# Patient Record
Sex: FEMALE | Race: ASIAN | Employment: UNEMPLOYED | URBAN - METROPOLITAN AREA
[De-identification: names, ages, dates, MRNs, and addresses within clinical notes are randomized per-mention and may not be internally consistent; named-entity substitution may affect disease eponyms.]

---

## 2018-12-28 ENCOUNTER — HOSPITAL ENCOUNTER (OUTPATIENT)
Dept: MAMMOGRAPHY | Age: 43
Discharge: HOME | End: 2018-12-28
Attending: FAMILY MEDICINE
Payer: COMMERCIAL

## 2018-12-28 DIAGNOSIS — Z12.39 BREAST CANCER SCREENING: ICD-10-CM

## 2018-12-28 PROCEDURE — 77067 SCR MAMMO BI INCL CAD: CPT

## 2019-12-23 ENCOUNTER — HOSPITAL ENCOUNTER (OUTPATIENT)
Dept: CT IMAGING | Age: 44
Discharge: HOME | End: 2019-12-23
Attending: EMERGENCY MEDICINE
Payer: COMMERCIAL

## 2019-12-23 DIAGNOSIS — R51.9 HEADACHE: ICD-10-CM

## 2019-12-23 PROCEDURE — 70450 CT HEAD/BRAIN W/O DYE: CPT

## 2020-01-17 ENCOUNTER — HOSPITAL ENCOUNTER (OUTPATIENT)
Dept: MAMMOGRAPHY | Age: 45
Discharge: HOME | End: 2020-01-17
Attending: FAMILY MEDICINE
Payer: COMMERCIAL

## 2020-01-17 DIAGNOSIS — Z12.31 VISIT FOR SCREENING MAMMOGRAM: ICD-10-CM

## 2020-01-17 PROCEDURE — 77067 SCR MAMMO BI INCL CAD: CPT

## 2020-06-22 ENCOUNTER — PATIENT OUTREACH (OUTPATIENT)
Dept: CASE MANAGEMENT | Age: 45
End: 2020-06-22

## 2020-06-22 NOTE — PROGRESS NOTES
Yellow alert noted in remote symptom monitoring program. Messaged patient to notify Ricardo Quintero if symptoms have worsened since yesterday or if they would like to have a nurse reach out.

## 2020-10-30 PROBLEM — Z01.419 WELL WOMAN EXAM: Status: ACTIVE | Noted: 2020-10-30

## 2022-03-01 ENCOUNTER — HOSPITAL ENCOUNTER (OUTPATIENT)
Dept: MAMMOGRAPHY | Age: 47
Discharge: HOME | End: 2022-03-01
Attending: FAMILY MEDICINE
Payer: COMMERCIAL

## 2022-03-01 DIAGNOSIS — Z12.31 ENCOUNTER FOR SCREENING MAMMOGRAM FOR HIGH-RISK PATIENT: ICD-10-CM

## 2022-03-01 DIAGNOSIS — Z12.31 SCREENING MAMMOGRAM FOR HIGH-RISK PATIENT: ICD-10-CM

## 2022-03-01 PROCEDURE — 77066 DX MAMMO INCL CAD BI: CPT

## 2022-03-10 PROBLEM — M77.11 RIGHT TENNIS ELBOW: Status: ACTIVE | Noted: 2022-03-10

## 2022-03-18 PROBLEM — M77.11 RIGHT TENNIS ELBOW: Status: ACTIVE | Noted: 2022-03-10

## 2022-03-20 PROBLEM — Z01.419 WELL WOMAN EXAM: Status: ACTIVE | Noted: 2020-10-30

## 2022-11-08 ENCOUNTER — OFFICE VISIT (OUTPATIENT)
Dept: INTERNAL MEDICINE CLINIC | Facility: CLINIC | Age: 47
End: 2022-11-08
Payer: MEDICAID

## 2022-11-08 ENCOUNTER — HOSPITAL ENCOUNTER (OUTPATIENT)
Dept: GENERAL RADIOLOGY | Age: 47
Discharge: HOME | End: 2022-11-11
Payer: MEDICAID

## 2022-11-08 VITALS
HEART RATE: 93 BPM | WEIGHT: 132 LBS | HEIGHT: 59 IN | BODY MASS INDEX: 26.61 KG/M2 | TEMPERATURE: 98.1 F | SYSTOLIC BLOOD PRESSURE: 110 MMHG | DIASTOLIC BLOOD PRESSURE: 80 MMHG | OXYGEN SATURATION: 99 %

## 2022-11-08 DIAGNOSIS — Z76.89 ENCOUNTER TO ESTABLISH CARE: ICD-10-CM

## 2022-11-08 DIAGNOSIS — R42 EPISODIC LIGHTHEADEDNESS: ICD-10-CM

## 2022-11-08 DIAGNOSIS — F31.75 BIPOLAR 1 DISORDER, DEPRESSED, PARTIAL REMISSION (HCC): ICD-10-CM

## 2022-11-08 DIAGNOSIS — M54.16 RIGHT LUMBAR RADICULOPATHY: ICD-10-CM

## 2022-11-08 DIAGNOSIS — M79.651 PAIN IN RIGHT THIGH: ICD-10-CM

## 2022-11-08 DIAGNOSIS — G44.52 NEW DAILY PERSISTENT HEADACHE: Primary | ICD-10-CM

## 2022-11-08 PROCEDURE — 72100 X-RAY EXAM L-S SPINE 2/3 VWS: CPT

## 2022-11-08 PROCEDURE — 99204 OFFICE O/P NEW MOD 45 MIN: CPT | Performed by: PHYSICIAN ASSISTANT

## 2022-11-08 PROCEDURE — 73502 X-RAY EXAM HIP UNI 2-3 VIEWS: CPT

## 2022-11-08 ASSESSMENT — PATIENT HEALTH QUESTIONNAIRE - PHQ9
SUM OF ALL RESPONSES TO PHQ9 QUESTIONS 1 & 2: 0
SUM OF ALL RESPONSES TO PHQ QUESTIONS 1-9: 0
2. FEELING DOWN, DEPRESSED OR HOPELESS: 0
SUM OF ALL RESPONSES TO PHQ QUESTIONS 1-9: 0
1. LITTLE INTEREST OR PLEASURE IN DOING THINGS: 0

## 2022-11-08 ASSESSMENT — ENCOUNTER SYMPTOMS
VOMITING: 0
NAUSEA: 0

## 2022-11-08 NOTE — PROGRESS NOTES
Getachew Sigala (:  1975) is a 49 y.o. female,New patient, here for evaluation of the following chief complaint(s):  New Patient (Pt c/o pain on R side of R thigh for the past several months. She states it is a constant pain and there was no trauma to the area. She states that there is itching in the same area as well. She also c/o feeling fatigued since starting a diet with minimal carbs and sugar. )         ASSESSMENT/PLAN:  1. New daily persistent headache  -     CT HEAD WO CONTRAST; Future  2. Right lumbar radiculopathy  3. Episodic lightheadedness  -     CT HEAD WO CONTRAST; Future  4. Pain in right thigh  -     XR LUMBAR SPINE (2-3 VIEWS); Future  -     XR HIP RIGHT (2-3 VIEWS); Future  5. Encounter to establish care  6. Bipolar 1 disorder, depressed, partial remission ([facility] [address]      Patient Instructions   Recommend incorporating an electrolyte drink 1-2 times/day along with the water she is drinking  Continue chronic medications as prescribed  Recommend getting newest COVID vaccine given formulation that should better protect against the most recently circulating Omicron variant  Consider physical therapy if x-rays don't reveal any specific abnormality with spine or hip      Return in about 2 months (around 2023) for routine physical.         Subjective   SUBJECTIVE/OBJECTIVE:  HPI  Patient presents today with daughter and the assistance of an  to establish care with a new PCP. Medical history is positive for bipolar 1 disorder managed with Abilify 10 mg daily by psychiatry - Dr. Bay Enamorado. Patient states she was recently increased from 5 to 10 mg daily but states it wasn't as helpful with symptoms of social issues when a lot of people are around. She complains of daily headaches upon getting up in the AM x several months. Pain is described as squeezing around her head.   Duration is occasionally all day but sometimes is able to distract herself with other activities and/or relieve with a single dose of Advil. She reports drinking about 2 liters of water daily and sleeps ~ 7 hours (10 pm - 5 am). Additional concerns addressed today include a pain in R leg described as \"heavy crushing pain\" along lateral side of the leg from knee up to waist/middle of buttock x 6 months. Triggering positions are leaning on or lying on R side. She has a history of back pain x 3 years that spontaneously resolved and was not specifically diagnosed. She also describes an itching sensation from center of buttock to waist level. Past Medical History:   Diagnosis Date    Allergic rhinitis 11/1/2016    Bipolar 1 disorder ([facility] [address] 11/1/2016    Depression 11/1/2016    Hypercholesterolemia 11/1/2016    Nontoxic uninodular goiter 11/1/2016       No past surgical history on file. Family History   Problem Relation Age of Onset    No Known Problems Mother     Liver Disease Father     Breast Cancer Neg Hx        Social History     Socioeconomic History    Marital status:      Spouse name: None    Number of children: None    Years of education: None    Highest education level: None   Tobacco Use    Smoking status: Never    Smokeless tobacco: Never   Vaping Use    Vaping Use: Never used   Substance and Sexual Activity    Alcohol use: Never    Drug use: Never    Sexual activity: Yes     Partners: Male       Current Outpatient Medications   Medication Sig Dispense Refill    ARIPiprazole (ABILIFY) 10 MG tablet Take 10 mg by mouth daily      ibuprofen (ADVIL;MOTRIN) 600 MG tablet Take 600 mg by mouth 2 times daily       No current facility-administered medications for this visit. Allergies   Allergen Reactions    Penicillins Rash and Other (See Comments)         Review of Systems   Constitutional:  Positive for fatigue (since starting a new diet). Negative for unexpected weight change. Eyes:  Negative for visual disturbance. Gastrointestinal:  Negative for nausea and vomiting.    Musculoskeletal:  Negative for back pain. Positive for R leg pain   Skin:         Positive for itching R buttock/R lower back   Neurological:  Positive for dizziness (lightheaded when tired or headache is present) and headaches. Negative for weakness and numbness. /80 (Site: Left Upper Arm, Position: Sitting, Cuff Size: Large Adult)   Pulse 93   Temp 98.1 °F (36.7 °C) (Temporal)   Ht 4' 11\" (1.499 m)   Wt 132 lb (59.9 kg)   LMP 11/08/2022   SpO2 99%   BMI 26.66 kg/m²       Objective   Physical Exam  Constitutional:       Appearance: Normal appearance. HENT:      Head: Normocephalic and atraumatic. Eyes:      Conjunctiva/sclera: Conjunctivae normal.      Pupils: Pupils are equal, round, and reactive to light. Neck:      Vascular: No carotid bruit. Cardiovascular:      Rate and Rhythm: Normal rate and regular rhythm. Heart sounds: Normal heart sounds. Pulmonary:      Effort: Pulmonary effort is normal.      Breath sounds: Normal breath sounds. Musculoskeletal:         General: Normal range of motion. Cervical back: Normal and normal range of motion. Lumbar back: Normal. Negative right straight leg raise test and negative left straight leg raise test.   Skin:     General: Skin is warm and dry. Findings: No rash. Neurological:      Mental Status: She is alert and oriented to person, place, and time. Deep Tendon Reflexes:      Reflex Scores:       Patellar reflexes are 2+ on the right side and 2+ on the left side. Psychiatric:         Mood and Affect: Mood normal.         Behavior: Behavior normal.         Thought Content: Thought content normal.         Judgment: Judgment normal.          On this date 11/8/2022 I have spent 55 minutes reviewing previous notes, test results and face to face with the patient discussing the diagnosis and importance of compliance with the treatment plan as well as documenting on the day of the visit.       An electronic signature was used to authenticate this note.     --Nathalie Soulier, PA-C

## 2022-11-10 NOTE — RESULT ENCOUNTER NOTE
X-ray shows minimal bone spurs in lumbar spine but alignment is normal & mild arthritis in R hip which doesn't explain the pain in her R thigh. I would like her to do physical therapy to try to alleviate the pain she is having in the leg. If she is agreeable, please find out which part of town she lives in so I can get her set up somewhere convenient to them.

## 2022-11-14 ENCOUNTER — TELEPHONE (OUTPATIENT)
Dept: INTERNAL MEDICINE CLINIC | Facility: CLINIC | Age: 47
End: 2022-11-14

## 2022-11-14 ASSESSMENT — ENCOUNTER SYMPTOMS: BACK PAIN: 0

## 2022-11-14 NOTE — TELEPHONE ENCOUNTER
ZOE on 11/14/22 @ 9:30 AM to inform pt's daughter that they need to contact radiology scheduling for pt to schedule her head CT ordered by Chapincito Mc on 11/8/22. Radiology scheduling's number left on v/m.

## 2022-11-15 NOTE — PATIENT INSTRUCTIONS
Recommend incorporating an electrolyte drink 1-2 times/day along with the water she is drinking  Continue chronic medications as prescribed  Recommend getting newest COVID vaccine given formulation that should better protect against the most recently circulating Omicron variant  Consider physical therapy if x-rays don't reveal any specific abnormality with spine or hip

## 2023-01-05 DIAGNOSIS — E78.00 HYPERCHOLESTEROLEMIA: Primary | ICD-10-CM

## 2023-01-05 DIAGNOSIS — E78.00 HYPERCHOLESTEROLEMIA: ICD-10-CM

## 2023-01-05 LAB
ALBUMIN SERPL-MCNC: 3.6 G/DL (ref 3.5–5)
ALBUMIN/GLOB SERPL: 0.9 (ref 0.4–1.6)
ALP SERPL-CCNC: 76 U/L (ref 50–136)
ALT SERPL-CCNC: 41 U/L (ref 12–65)
ANION GAP SERPL CALC-SCNC: 4 MMOL/L (ref 2–11)
APPEARANCE UR: CLEAR
AST SERPL-CCNC: 31 U/L (ref 15–37)
BACTERIA URNS QL MICRO: NEGATIVE /HPF
BASOPHILS # BLD: 0 K/UL (ref 0–0.2)
BASOPHILS NFR BLD: 0 % (ref 0–2)
BILIRUB SERPL-MCNC: 0.2 MG/DL (ref 0.2–1.1)
BILIRUB UR QL: NEGATIVE
BUN SERPL-MCNC: 8 MG/DL (ref 6–23)
CALCIUM SERPL-MCNC: 8.4 MG/DL (ref 8.3–10.4)
CASTS URNS QL MICRO: ABNORMAL /LPF (ref 0–2)
CHLORIDE SERPL-SCNC: 107 MMOL/L (ref 101–110)
CHOLEST SERPL-MCNC: 227 MG/DL
CO2 SERPL-SCNC: 26 MMOL/L (ref 21–32)
COLOR UR: ABNORMAL
CREAT SERPL-MCNC: 0.6 MG/DL (ref 0.6–1)
DIFFERENTIAL METHOD BLD: NORMAL
EOSINOPHIL # BLD: 0.1 K/UL (ref 0–0.8)
EOSINOPHIL NFR BLD: 2 % (ref 0.5–7.8)
EPI CELLS #/AREA URNS HPF: ABNORMAL /HPF (ref 0–5)
ERYTHROCYTE [DISTWIDTH] IN BLOOD BY AUTOMATED COUNT: 13.5 % (ref 11.9–14.6)
GLOBULIN SER CALC-MCNC: 3.8 G/DL (ref 2.8–4.5)
GLUCOSE SERPL-MCNC: 92 MG/DL (ref 65–100)
GLUCOSE UR STRIP.AUTO-MCNC: NEGATIVE MG/DL
HCT VFR BLD AUTO: 39.8 % (ref 35.8–46.3)
HDLC SERPL-MCNC: 51 MG/DL (ref 40–60)
HDLC SERPL: 4.5
HGB BLD-MCNC: 12.9 G/DL (ref 11.7–15.4)
HGB UR QL STRIP: ABNORMAL
IMM GRANULOCYTES # BLD AUTO: 0 K/UL (ref 0–0.5)
IMM GRANULOCYTES NFR BLD AUTO: 0 % (ref 0–5)
KETONES UR QL STRIP.AUTO: NEGATIVE MG/DL
LDLC SERPL CALC-MCNC: 140.8 MG/DL
LEUKOCYTE ESTERASE UR QL STRIP.AUTO: NEGATIVE
LYMPHOCYTES # BLD: 2.2 K/UL (ref 0.5–4.6)
LYMPHOCYTES NFR BLD: 43 % (ref 13–44)
MCH RBC QN AUTO: 28.5 PG (ref 26.1–32.9)
MCHC RBC AUTO-ENTMCNC: 32.4 G/DL (ref 31.4–35)
MCV RBC AUTO: 88.1 FL (ref 82–102)
MONOCYTES # BLD: 0.4 K/UL (ref 0.1–1.3)
MONOCYTES NFR BLD: 8 % (ref 4–12)
MUCOUS THREADS URNS QL MICRO: 0 /LPF
NEUTS SEG # BLD: 2.3 K/UL (ref 1.7–8.2)
NEUTS SEG NFR BLD: 47 % (ref 43–78)
NITRITE UR QL STRIP.AUTO: NEGATIVE
NRBC # BLD: 0 K/UL (ref 0–0.2)
PH UR STRIP: 6.5 (ref 5–9)
PLATELET # BLD AUTO: 213 K/UL (ref 150–450)
PMV BLD AUTO: 10.1 FL (ref 9.4–12.3)
POTASSIUM SERPL-SCNC: 4 MMOL/L (ref 3.5–5.1)
PROT SERPL-MCNC: 7.4 G/DL (ref 6.3–8.2)
PROT UR STRIP-MCNC: NEGATIVE MG/DL
RBC # BLD AUTO: 4.52 M/UL (ref 4.05–5.2)
RBC #/AREA URNS HPF: ABNORMAL /HPF (ref 0–5)
SODIUM SERPL-SCNC: 137 MMOL/L (ref 133–143)
SP GR UR REFRACTOMETRY: 1.01 (ref 1–1.02)
TRIGL SERPL-MCNC: 176 MG/DL (ref 35–150)
TSH, 3RD GENERATION: 2.5 UIU/ML (ref 0.36–3.74)
URINE CULTURE IF INDICATED: ABNORMAL
UROBILINOGEN UR QL STRIP.AUTO: 0.2 EU/DL (ref 0.2–1)
VLDLC SERPL CALC-MCNC: 35.2 MG/DL (ref 6–23)
WBC # BLD AUTO: 5 K/UL (ref 4.3–11.1)
WBC URNS QL MICRO: ABNORMAL /HPF (ref 0–4)

## 2023-01-09 ENCOUNTER — TELEPHONE (OUTPATIENT)
Dept: INTERNAL MEDICINE CLINIC | Facility: CLINIC | Age: 48
End: 2023-01-09

## 2023-01-09 ENCOUNTER — TELEMEDICINE (OUTPATIENT)
Dept: INTERNAL MEDICINE CLINIC | Facility: CLINIC | Age: 48
End: 2023-01-09
Payer: MEDICAID

## 2023-01-09 DIAGNOSIS — E78.00 HYPERCHOLESTEROLEMIA: Primary | ICD-10-CM

## 2023-01-09 DIAGNOSIS — F31.9 BIPOLAR 1 DISORDER (HCC): ICD-10-CM

## 2023-01-09 DIAGNOSIS — G44.52 NEW DAILY PERSISTENT HEADACHE: ICD-10-CM

## 2023-01-09 DIAGNOSIS — Z12.31 SCREENING MAMMOGRAM FOR BREAST CANCER: ICD-10-CM

## 2023-01-09 PROCEDURE — 99214 OFFICE O/P EST MOD 30 MIN: CPT | Performed by: NURSE PRACTITIONER

## 2023-01-09 ASSESSMENT — ENCOUNTER SYMPTOMS
ABDOMINAL PAIN: 0
SORE THROAT: 0
DIARRHEA: 0
VOMITING: 0
SHORTNESS OF BREATH: 0
COUGH: 0
CONSTIPATION: 0
NAUSEA: 0
EYE PAIN: 0
RHINORRHEA: 0

## 2023-01-09 NOTE — PROGRESS NOTES
Lila North (:  1975) is a Established patient, here for evaluation of the following:    Assessment & Plan   Below is the assessment and plan developed based on review of pertinent history, physical exam, labs, studies, and medications. 1. Hypercholesterolemia  The patient is asked to make an attempt to improve diet and exercise patterns to aid in medical management of this problem. Exercise recommendations of at least 150 minutes weekly discussed. 2. Bipolar 1 disorder ([address]  Continue follow-up and management per psychiatry. 3. New daily persistent headache  Improved with increased water intake and incorporation of electrolyte drink daily. Encouraged healthy (whole foods) diet, regular exercise, sleep hygiene and stress reduction. 4. Screening mammogram for breast cancer  -     [facility] DIGITAL SCREEN W OR WO CAD BILATERAL; Future      Return in about 6 months (around 2023) for ROUTINE FOLLOW-UP, LAB REVIEW & MEDICATION REFILL. Subjective   HPI  Patient seen virtually today for follow-up on chronic conditions and results review. The patient is a 49 y.o. female who is seen for evaluation of  hyperlipidemia. She was tested because history of elevated LDL. Her last labs showed Total cholesterol of 227, HDL 51, ,  Triglycerides 176. Cardiac symptoms: none. She is followed quarterly by Dr. Arnol Fiore (psychiatry) for management of her bipolar disorder. She is happy with the Abilify that she is currently on and denies any unfavorable side effects. Headaches still present intermittently but are improved since patient has increased fluid intake as recommended at last visit. Head CT recommended, but coverage was declined by insurance per patient. Up-to-date on cervical cancer screening. Will be due to repeat mammogram later this year. Declines colon cancer screening at this time. Labs reviewed and discussed.  Medications refilled as needed during office visit or adjusted based on lab results and/or our discussion as appropriate. See discussion. Review of Systems   Constitutional:  Negative for chills and fever. HENT:  Negative for congestion, ear pain, postnasal drip, rhinorrhea and sore throat. Eyes:  Negative for pain and visual disturbance. Respiratory:  Negative for cough and shortness of breath. Cardiovascular:  Negative for chest pain, palpitations and leg swelling. Gastrointestinal:  Negative for abdominal pain, constipation, diarrhea, nausea and vomiting. Genitourinary:  Negative for dysuria and hematuria. Musculoskeletal:  Negative for neck pain. Neurological:  Positive for headaches. Negative for dizziness, syncope and light-headedness. Objective      Physical Exam  Constitutional:       General: She is not in acute distress. Appearance: Normal appearance. HENT:      Head: Normocephalic and atraumatic. Pulmonary:      Effort: No respiratory distress. Neurological:      Mental Status: She is alert and oriented to person, place, and time.    Psychiatric:         Mood and Affect: Mood normal.         Lab Results   Component Value Date/Time    WBC 5.0 01/05/2023 10:27 AM    HGB 12.9 01/05/2023 10:27 AM    HCT 39.8 01/05/2023 10:27 AM    MCV 88.1 01/05/2023 10:27 AM    RDW 13.5 01/05/2023 10:27 AM     01/05/2023 10:27 AM    NEUTOPHILPCT 51 12/28/2021 01:43 PM    LYMPHOPCT 43 01/05/2023 10:27 AM    LYMPHOPCT 36 12/28/2021 01:43 PM    MONOPCT 8 01/05/2023 10:27 AM    MONOPCT 7 12/28/2021 01:43 PM    EOSRELPCT 2 01/05/2023 10:27 AM    BASOPCT 0 01/05/2023 10:27 AM    BASOPCT 0 12/28/2021 01:43 PM    LYMPHSABS 2.2 01/05/2023 10:27 AM    LYMPHSABS 2.3 12/28/2021 01:43 PM    MONOSABS 0.4 01/05/2023 10:27 AM    MONOSABS 0.5 12/28/2021 01:43 PM    EOSABS 0.1 01/05/2023 10:27 AM    EOSABS 0.4 12/28/2021 01:43 PM    BASOSABS 0.0 01/05/2023 10:27 AM    IMMGRAN 0 01/05/2023 10:27 AM    GRANULOCYTEABSOLUTECOUNT 0.0 12/28/2021 01:43 PM       Lab Results   Component Value Date/Time     01/05/2023 10:27 AM    K 4.0 01/05/2023 10:27 AM     01/05/2023 10:27 AM    CO2 26 01/05/2023 10:27 AM    ANIONGAP 4 01/05/2023 10:27 AM    GLUCOSE 92 01/05/2023 10:27 AM    BUN 8 01/05/2023 10:27 AM    CREATININE 0.60 01/05/2023 10:27 AM    GFRAA 125 12/28/2021 01:43 PM    LABGLOM >60 01/05/2023 10:27 AM    CALCIUM 8.4 01/05/2023 10:27 AM    BILITOT 0.2 01/05/2023 10:27 AM    ALT 41 01/05/2023 10:27 AM    AST 31 01/05/2023 10:27 AM    ALKPHOS 76 01/05/2023 10:27 AM    ALKPHOS 55 12/28/2021 01:43 PM    PROT 7.4 01/05/2023 10:27 AM    LABALBU 3.6 01/05/2023 10:27 AM    GLOB 3.8 01/05/2023 10:27 AM    ALBUMIN 0.9 01/05/2023 10:27 AM       Lab Results   Component Value Date/Time    CHOL 227 01/05/2023 10:27 AM    HDL 51 01/05/2023 10:27 AM    TRIG 176 01/05/2023 10:27 AM    LDLCALC 140.8 01/05/2023 10:27 AM    VLDL 18 12/28/2021 01:43 PM       No results found for: LABA1C    Lab Results   Component Value Date/Time    TSH 1.590 12/28/2021 01:43 PM    TSH 2.220 11/12/2020 01:45 PM       No results found for: PSA    Lab Results   Component Value Date/Time    SPECGRAV 1.006 01/05/2023 10:27 AM    COLORU YELLOW/STRAW 01/05/2023 10:27 AM    LEUKOCYTESUR Negative 01/05/2023 10:27 AM    PROTEINU Negative 01/05/2023 10:27 AM    GLUCOSEU Negative 01/05/2023 10:27 AM    KETUA Negative 01/05/2023 10:27 AM    BLOODU TRACE 01/05/2023 10:27 AM    BILIRUBINUR Negative 01/05/2023 10:27 AM    UROBILINOGEN 0.2 01/05/2023 10:27 AM    NITRU Negative 01/05/2023 10:27 AM            On this date 1/9/2023 I have spent 30 minutes reviewing previous notes, test results and face to face (virtual) with the patient discussing the diagnosis and importance of compliance with the treatment plan as well as documenting on the day of the visit. Nicolas Singh, was evaluated through a synchronous (real-time) audio-video encounter.  The patient (or guardian if applicable) is aware that this is a billable service, which includes applicable co-pays. This Virtual Visit was conducted with patient's (and/or legal guardian's) consent. The visit was conducted pursuant to the emergency declaration under the [facility], [address] [place] [address] authority and the [place] and [employer] Act. Patient identification was verified, and a caregiver was present when appropriate. The patient was located at Home: [address] [address]. Provider was located at [facility] (Appt Dept): [address] [facility] [address],  [address].         --AUDRA Chance - CNP

## 2023-03-07 ENCOUNTER — HOSPITAL ENCOUNTER (OUTPATIENT)
Dept: MAMMOGRAPHY | Age: 48
Discharge: HOME | End: 2023-03-10
Payer: COMMERCIAL

## 2023-03-07 DIAGNOSIS — Z12.31 SCREENING MAMMOGRAM FOR BREAST CANCER: ICD-10-CM

## 2023-03-07 PROCEDURE — 77067 SCR MAMMO BI INCL CAD: CPT

## 2023-07-13 ENCOUNTER — HOSPITAL ENCOUNTER (OUTPATIENT)
Dept: LAB | Age: 48
Discharge: HOME | End: 2023-07-16

## 2023-07-13 DIAGNOSIS — E78.2 MIXED HYPERLIPIDEMIA: ICD-10-CM

## 2023-07-13 LAB
CHOLEST SERPL-MCNC: 249 MG/DL
HDLC SERPL-MCNC: 45 MG/DL (ref 40–60)
HDLC SERPL: 5.5
LDLC SERPL CALC-MCNC: ABNORMAL MG/DL
LDLC SERPL DIRECT ASSAY-MCNC: 137 MG/DL
TRIGL SERPL-MCNC: 412 MG/DL (ref 35–150)
VLDLC SERPL CALC-MCNC: 82.4 MG/DL (ref 6–23)

## 2023-07-17 NOTE — RESULT ENCOUNTER NOTE
Cholesterol levels have increased even further rather than decreased as we were aiming for - LDL is relatively stable but needs to be lower (this is caused from high fat foods like fried foods, red meats, cheese, egg yolks, ice cream, etc) but triglycerides increased the worst by 236 points in the past 6 months (this value is affected by alcohol, sugar & carbohydrates - bread, pasta, potatoes, rice). Please have her come in to discuss these values in person.

## 2023-09-11 ASSESSMENT — ENCOUNTER SYMPTOMS: SHORTNESS OF BREATH: 0

## 2023-09-11 NOTE — PROGRESS NOTES
Mitch Nuñez (:  1975) is a 51 y.o. female,Established patient, here for evaluation of the following chief complaint(s):  Follow-up (Hypertriglyceridemia) and Discuss Labs         ASSESSMENT/PLAN:  1. Secondary hypertriglyceridemia  Comments:  Likely secondary to antipsychotic  Orders:  -     Comprehensive Metabolic Panel; Future  -     Lipid Panel; Future  2. Chronic gastritis without bleeding, unspecified gastritis type  -     famotidine (PEPCID) 20 MG tablet; Take 1 tablet by mouth 2 times daily, Disp-60 tablet, R-3Normal  -     CBC with Auto Differential; Future  3. Bipolar 1 disorder ([facility])  -     Georgette Molina MD, Psychiatry, [facility]      Patient Instructions   Recommend trial switch from Abilify to Arlyce Boards in hopes of alleviating metabolic issues that seem to be arising with increased dose of Abilify  Start Vraylar 1.5 mg daily x 1 week then increase to 3 mg daily for maintenance  Discontinue Abilify  Begin Famotidine 20 mg twice daily for stomach issues/symptoms - plan to continue x 2 months  Maintain a low sugar diet with concentration of fresh whole foods  Encouraged to proceed with sleep study for thorough evaluation of her fatigue & daily headaches      Return in about 6 weeks (around 10/24/2023) for recheck. Subjective   SUBJECTIVE/OBJECTIVE:  HPI  The patient is a 51 y.o. female who is seen for evaluation of hyperlipidemia. She was tested because of mixed hyperlipidemia. The current state of this condition is asymptomatic and poorly controlled - not currently on medications for this problem. She has maintained a reduced sugar & fatty food diet but has unfortunately still gained weight and triglycerides continue to exponentially increase. She admits to not exercising due to it making her feel tired. Medication changes include increasing Abilify to 10 mg daily ~ 6 months ago.       Last Lipid Panel:   Lab Results   Component Value Date    CHOL 249 (H) 2023    CHOL 227

## 2023-09-12 ENCOUNTER — OFFICE VISIT (OUTPATIENT)
Dept: INTERNAL MEDICINE CLINIC | Facility: CLINIC | Age: 48
End: 2023-09-12
Payer: COMMERCIAL

## 2023-09-12 VITALS
HEART RATE: 88 BPM | DIASTOLIC BLOOD PRESSURE: 78 MMHG | OXYGEN SATURATION: 98 % | WEIGHT: 143 LBS | BODY MASS INDEX: 28.83 KG/M2 | SYSTOLIC BLOOD PRESSURE: 108 MMHG | HEIGHT: 59 IN | TEMPERATURE: 97.3 F

## 2023-09-12 DIAGNOSIS — F31.9 BIPOLAR 1 DISORDER (HCC): ICD-10-CM

## 2023-09-12 DIAGNOSIS — K29.50 CHRONIC GASTRITIS WITHOUT BLEEDING, UNSPECIFIED GASTRITIS TYPE: ICD-10-CM

## 2023-09-12 DIAGNOSIS — E78.1 SECONDARY HYPERTRIGLYCERIDEMIA: Primary | ICD-10-CM

## 2023-09-12 PROCEDURE — 99215 OFFICE O/P EST HI 40 MIN: CPT | Performed by: PHYSICIAN ASSISTANT

## 2023-09-12 RX ORDER — FAMOTIDINE 20 MG/1
20 TABLET, FILM COATED ORAL 2 TIMES DAILY
Qty: 60 TABLET | Refills: 3 | Status: SHIPPED | OUTPATIENT
Start: 2023-09-12

## 2023-09-12 ASSESSMENT — ANXIETY QUESTIONNAIRES
GAD7 TOTAL SCORE: 15
5. BEING SO RESTLESS THAT IT IS HARD TO SIT STILL: 1
3. WORRYING TOO MUCH ABOUT DIFFERENT THINGS: 3
7. FEELING AFRAID AS IF SOMETHING AWFUL MIGHT HAPPEN: 2
6. BECOMING EASILY ANNOYED OR IRRITABLE: 2
2. NOT BEING ABLE TO STOP OR CONTROL WORRYING: 3
1. FEELING NERVOUS, ANXIOUS, OR ON EDGE: 3
4. TROUBLE RELAXING: 1
IF YOU CHECKED OFF ANY PROBLEMS ON THIS QUESTIONNAIRE, HOW DIFFICULT HAVE THESE PROBLEMS MADE IT FOR YOU TO DO YOUR WORK, TAKE CARE OF THINGS AT HOME, OR GET ALONG WITH OTHER PEOPLE: VERY DIFFICULT

## 2023-09-12 ASSESSMENT — PATIENT HEALTH QUESTIONNAIRE - PHQ9
10. IF YOU CHECKED OFF ANY PROBLEMS, HOW DIFFICULT HAVE THESE PROBLEMS MADE IT FOR YOU TO DO YOUR WORK, TAKE CARE OF THINGS AT HOME, OR GET ALONG WITH OTHER PEOPLE: 2
SUM OF ALL RESPONSES TO PHQ9 QUESTIONS 1 & 2: 4
5. POOR APPETITE OR OVEREATING: 0
4. FEELING TIRED OR HAVING LITTLE ENERGY: 1
SUM OF ALL RESPONSES TO PHQ QUESTIONS 1-9: 13
6. FEELING BAD ABOUT YOURSELF - OR THAT YOU ARE A FAILURE OR HAVE LET YOURSELF OR YOUR FAMILY DOWN: 3
SUM OF ALL RESPONSES TO PHQ QUESTIONS 1-9: 12
7. TROUBLE CONCENTRATING ON THINGS, SUCH AS READING THE NEWSPAPER OR WATCHING TELEVISION: 2
2. FEELING DOWN, DEPRESSED OR HOPELESS: 2
SUM OF ALL RESPONSES TO PHQ QUESTIONS 1-9: 13
8. MOVING OR SPEAKING SO SLOWLY THAT OTHER PEOPLE COULD HAVE NOTICED. OR THE OPPOSITE, BEING SO FIGETY OR RESTLESS THAT YOU HAVE BEEN MOVING AROUND A LOT MORE THAN USUAL: 2
1. LITTLE INTEREST OR PLEASURE IN DOING THINGS: 2
9. THOUGHTS THAT YOU WOULD BE BETTER OFF DEAD, OR OF HURTING YOURSELF: 1
SUM OF ALL RESPONSES TO PHQ QUESTIONS 1-9: 13
3. TROUBLE FALLING OR STAYING ASLEEP: 0

## 2023-09-12 ASSESSMENT — ENCOUNTER SYMPTOMS
CONSTIPATION: 0
NAUSEA: 0
DIARRHEA: 1
ABDOMINAL DISTENTION: 1
ABDOMINAL PAIN: 1
BLOOD IN STOOL: 0
VOMITING: 0

## 2023-09-12 NOTE — PATIENT INSTRUCTIONS
Recommend trial switch from Abilify to Kacie Walton in hopes of alleviating metabolic issues that seem to be arising with increased dose of Abilify  Start Vraylar 1.5 mg daily x 1 week then increase to 3 mg daily for maintenance  Discontinue Abilify  Begin Famotidine 20 mg twice daily for stomach issues/symptoms - plan to continue x 2 months  Maintain a low sugar diet with concentration of fresh whole foods  Encouraged to proceed with sleep study for thorough evaluation of her fatigue & daily headaches

## 2023-09-15 ENCOUNTER — TELEPHONE (OUTPATIENT)
Dept: INTERNAL MEDICINE CLINIC | Facility: CLINIC | Age: 48
End: 2023-09-15

## 2023-09-15 NOTE — TELEPHONE ENCOUNTER
Contacted pt using interpreting services ( ID: [id number]) to inform her that 2 boxes of Vraylar 3 mg capsules have been placed at the  for pt to  at her convenience. Pt verbalized understanding.     Lot: [id number]  Exp: 7/2025

## 2023-09-21 ENCOUNTER — TELEPHONE (OUTPATIENT)
Dept: INTERNAL MEDICINE CLINIC | Facility: CLINIC | Age: 48
End: 2023-09-21

## 2023-09-21 NOTE — TELEPHONE ENCOUNTER
Contacted pt using interpreting services (ID: [id number]) to inform pt that 2 more boxes of Braylar 3 mg samples have been placed at the  for her to pick-up at her convenience and these should last until her upcoming appt in Oct. Pt verbalized understanding.      Lot: [id number]  Exp: 07/2025

## 2023-10-24 ENCOUNTER — HOSPITAL ENCOUNTER (OUTPATIENT)
Dept: LAB | Age: 48
Discharge: HOME | End: 2023-10-27

## 2023-10-30 ASSESSMENT — ENCOUNTER SYMPTOMS: SHORTNESS OF BREATH: 0

## 2023-10-30 NOTE — PROGRESS NOTES
Shama Christianson (:  1975) is a 51 y.o. female,Established patient, here for evaluation of the following chief complaint(s):  Hyperlipidemia (Pt is here for a 6 week f/u.) and Breast Problem (Pt c/o a tingling sensation on the right side of her R breast that has happened twice in the past month, last time was this morning. )         ASSESSMENT/PLAN:  1. Secondary hypertriglyceridemia  -     Lipid Panel; Future  2. Bipolar 1 disorder (HCC)  -     cariprazine hcl (VRAYLAR) 3 MG CAPS capsule; Take 1 capsule by mouth every morning, Disp-30 capsule, R-5Normal  3. Pain of right breast  -     [facility] DIGITAL DIAGNOSTIC W OR WO CAD RIGHT; Future  -     US BREAST COMPLETE RIGHT; Future      Patient Instructions   Advised moving Luis Finical to AM dosing in hopes of less stimulation at night allowing her to rest better and more hours - if still not sleeping well we may need to reduce dose to 1.5 mg daily  Reviewed significant improvement in cholesterol panel which I presume is related to medication change more than diet but encouraged to maintain a low fat diet  Maintain monthly self breast exams  Continue chronic medications as prescribed  Will consider referring back to psychiatry if mood appears to remain borderline manic and/or if further medication adjustments are needed      Return in about 4 months (around 2024) for routine f/u. Subjective   SUBJECTIVE/OBJECTIVE:  HPI - Obtained through the assistance of an   The patient is a 51 y.o. female who is seen for evaluation of hyperlipidemia. She was tested because of mixed hyperlipidemia. The current state of this condition is asymptomatic and improved on OTC Omega-3 supplement daily - taking as prescribed. She has been eating less \"bad things\" and less fat in her diet. Additionally, she was changed from Abilify to Luis Finical ~ 6 weeks ago.       Last Lipid Panel:   Lab Results   Component Value Date    CHOL 203 (H) 10/24/2023    CHOL 249 (H) 2023

## 2023-10-31 ENCOUNTER — OFFICE VISIT (OUTPATIENT)
Dept: INTERNAL MEDICINE CLINIC | Facility: CLINIC | Age: 48
End: 2023-10-31
Payer: COMMERCIAL

## 2023-10-31 ENCOUNTER — TELEPHONE (OUTPATIENT)
Dept: INTERNAL MEDICINE CLINIC | Facility: CLINIC | Age: 48
End: 2023-10-31

## 2023-10-31 VITALS
HEIGHT: 59 IN | WEIGHT: 135 LBS | SYSTOLIC BLOOD PRESSURE: 98 MMHG | TEMPERATURE: 98.4 F | OXYGEN SATURATION: 98 % | DIASTOLIC BLOOD PRESSURE: 70 MMHG | BODY MASS INDEX: 27.21 KG/M2 | HEART RATE: 84 BPM

## 2023-10-31 DIAGNOSIS — E78.1 SECONDARY HYPERTRIGLYCERIDEMIA: Primary | ICD-10-CM

## 2023-10-31 DIAGNOSIS — F31.9 BIPOLAR 1 DISORDER (HCC): ICD-10-CM

## 2023-10-31 DIAGNOSIS — N64.4 PAIN OF RIGHT BREAST: ICD-10-CM

## 2023-10-31 PROCEDURE — 99214 OFFICE O/P EST MOD 30 MIN: CPT | Performed by: PHYSICIAN ASSISTANT

## 2023-10-31 ASSESSMENT — COLUMBIA-SUICIDE SEVERITY RATING SCALE - C-SSRS
5. HAVE YOU STARTED TO WORK OUT OR WORKED OUT THE DETAILS OF HOW TO KILL YOURSELF? DO YOU INTEND TO CARRY OUT THIS PLAN?: NO
4. HAVE YOU HAD THESE THOUGHTS AND HAD SOME INTENTION OF ACTING ON THEM?: NO
3. HAVE YOU BEEN THINKING ABOUT HOW YOU MIGHT KILL YOURSELF?: NO
7. DID THIS OCCUR IN THE LAST THREE MONTHS: NO

## 2023-10-31 ASSESSMENT — ANXIETY QUESTIONNAIRES
2. NOT BEING ABLE TO STOP OR CONTROL WORRYING: 0
GAD7 TOTAL SCORE: 0
IF YOU CHECKED OFF ANY PROBLEMS ON THIS QUESTIONNAIRE, HOW DIFFICULT HAVE THESE PROBLEMS MADE IT FOR YOU TO DO YOUR WORK, TAKE CARE OF THINGS AT HOME, OR GET ALONG WITH OTHER PEOPLE: NOT DIFFICULT AT ALL
5. BEING SO RESTLESS THAT IT IS HARD TO SIT STILL: 0
3. WORRYING TOO MUCH ABOUT DIFFERENT THINGS: 0
1. FEELING NERVOUS, ANXIOUS, OR ON EDGE: 0
4. TROUBLE RELAXING: 0
6. BECOMING EASILY ANNOYED OR IRRITABLE: 0
7. FEELING AFRAID AS IF SOMETHING AWFUL MIGHT HAPPEN: 0

## 2023-10-31 ASSESSMENT — PATIENT HEALTH QUESTIONNAIRE - PHQ9
SUM OF ALL RESPONSES TO PHQ QUESTIONS 1-9: 1
2. FEELING DOWN, DEPRESSED OR HOPELESS: 0
10. IF YOU CHECKED OFF ANY PROBLEMS, HOW DIFFICULT HAVE THESE PROBLEMS MADE IT FOR YOU TO DO YOUR WORK, TAKE CARE OF THINGS AT HOME, OR GET ALONG WITH OTHER PEOPLE: 0
3. TROUBLE FALLING OR STAYING ASLEEP: 1
SUM OF ALL RESPONSES TO PHQ QUESTIONS 1-9: 1
8. MOVING OR SPEAKING SO SLOWLY THAT OTHER PEOPLE COULD HAVE NOTICED. OR THE OPPOSITE, BEING SO FIGETY OR RESTLESS THAT YOU HAVE BEEN MOVING AROUND A LOT MORE THAN USUAL: 0
4. FEELING TIRED OR HAVING LITTLE ENERGY: 0
5. POOR APPETITE OR OVEREATING: 0
SUM OF ALL RESPONSES TO PHQ9 QUESTIONS 1 & 2: 0
6. FEELING BAD ABOUT YOURSELF - OR THAT YOU ARE A FAILURE OR HAVE LET YOURSELF OR YOUR FAMILY DOWN: 0
9. THOUGHTS THAT YOU WOULD BE BETTER OFF DEAD, OR OF HURTING YOURSELF: 0
7. TROUBLE CONCENTRATING ON THINGS, SUCH AS READING THE NEWSPAPER OR WATCHING TELEVISION: 0
SUM OF ALL RESPONSES TO PHQ QUESTIONS 1-9: 1
1. LITTLE INTEREST OR PLEASURE IN DOING THINGS: 0
SUM OF ALL RESPONSES TO PHQ QUESTIONS 1-9: 1

## 2023-10-31 NOTE — TELEPHONE ENCOUNTER
PA initiated on CMM through [employer] for Vraylar 3 mg capsules. CMM Key: [id number]. Waiting on determination.

## 2023-10-31 NOTE — PATIENT INSTRUCTIONS
Advised moving Vraylar to AM dosing in hopes of less stimulation at night allowing her to rest better and more hours - if still not sleeping well we may need to reduce dose to 1.5 mg daily  Reviewed significant improvement in cholesterol panel which I presume is related to medication change more than diet but encouraged to maintain a low fat diet  Maintain monthly self breast exams  Continue chronic medications as prescribed  Will consider referring back to psychiatry if mood appears to remain borderline manic and/or if further medication adjustments are needed

## 2023-11-02 NOTE — TELEPHONE ENCOUNTER
Please find out if she remembers taking any of the following meds in the past - Geodon, Seroquel, Risperdal, or Zyprexa? Insurance is saying she has to try at least 2 of these meds before they will pay for Vraylar. Let's also find out how many samples she has left of the 3 mg so we can get some more for her while we work through this with insurance.

## 2023-11-02 NOTE — TELEPHONE ENCOUNTER
Message from plan: Denied. Unable to approve the medication (VRAYLAR Capsule 3MG) due to unmet criteria (3) as outlined in the plan guideline below. Member must try and fail the following drug alternatives: one more preferred atypical antipsychotics (e.g., ziprasidone, quetiapine, risperidone, olanzapine). If you are looking for additional alternatives, please refer to the plan's preferred drug list. This list can be found on the health plan's website. Unless otherwise stated please use generics when available. Plan guideline (CP.PMN.91 Cariprazine (Sarahy Schmidt)) requires the following prior to consideration of approval: Bipolar Disorder and Schizophrenia (must meet all): 1. Diagnosis of bipolar disorder or schizophrenia; 2. Age at least 25 years; 3. Member meets one of the following (a or b): a. Request is for the treatment of a member in a [place] with limitations on step therapy in certain mental health settings; b. Failure of two preferred atypical antipsychotics (e.g., aripiprazole, ziprasidone, quetiapine, risperidone, olanzapine) at up to maximally indicated doses, each used for at least 4 weeks, unless clinically significant adverse effects are experienced or all are contraindicated; 4. Dose does not exceed any of the following (a or b): a. Schizophrenia or manic or mixed episodes of bipolar I disorder (i and ii): i. 6 mg per day; ii. 1 capsule per day; b. Depressive episodes of bipolar I disorder (i and ii): i. 3 mg per day; ii. 1 capsule per day. Note: If you believe the member has met criteria (3) as described above, please resubmit your request with additional clinically supportive documentation for consideration.

## 2023-11-14 ENCOUNTER — OFFICE VISIT (OUTPATIENT)
Dept: BEHAVIORAL/MENTAL HEALTH CLINIC | Age: 48
End: 2023-11-14
Payer: COMMERCIAL

## 2023-11-14 VITALS
SYSTOLIC BLOOD PRESSURE: 112 MMHG | WEIGHT: 138 LBS | BODY MASS INDEX: 27.82 KG/M2 | OXYGEN SATURATION: 98 % | HEART RATE: 90 BPM | HEIGHT: 59 IN | TEMPERATURE: 97.5 F | DIASTOLIC BLOOD PRESSURE: 80 MMHG

## 2023-11-14 DIAGNOSIS — F43.10 PTSD (POST-TRAUMATIC STRESS DISORDER): ICD-10-CM

## 2023-11-14 DIAGNOSIS — F41.1 GENERALIZED ANXIETY DISORDER: Primary | ICD-10-CM

## 2023-11-14 DIAGNOSIS — F25.0 SCHIZOAFFECTIVE DISORDER, BIPOLAR TYPE (HCC): ICD-10-CM

## 2023-11-14 PROBLEM — F31.31 BIPOLAR 1 DISORDER, DEPRESSED, MILD (HCC): Status: ACTIVE | Noted: 2023-11-14

## 2023-11-14 PROBLEM — F31.31 BIPOLAR 1 DISORDER, DEPRESSED, MILD (HCC): Status: RESOLVED | Noted: 2023-11-14 | Resolved: 2023-11-14

## 2023-11-14 PROCEDURE — 90792 PSYCH DIAG EVAL W/MED SRVCS: CPT

## 2023-11-14 RX ORDER — HYDROXYZINE HYDROCHLORIDE 10 MG/1
TABLET, FILM COATED ORAL
Qty: 90 TABLET | Refills: 0 | Status: SHIPPED | OUTPATIENT
Start: 2023-11-14

## 2023-11-14 ASSESSMENT — ANXIETY QUESTIONNAIRES
6. BECOMING EASILY ANNOYED OR IRRITABLE: 2
1. FEELING NERVOUS, ANXIOUS, OR ON EDGE: 2
7. FEELING AFRAID AS IF SOMETHING AWFUL MIGHT HAPPEN: 3
IF YOU CHECKED OFF ANY PROBLEMS ON THIS QUESTIONNAIRE, HOW DIFFICULT HAVE THESE PROBLEMS MADE IT FOR YOU TO DO YOUR WORK, TAKE CARE OF THINGS AT HOME, OR GET ALONG WITH OTHER PEOPLE: VERY DIFFICULT
4. TROUBLE RELAXING: 1
3. WORRYING TOO MUCH ABOUT DIFFERENT THINGS: 2
GAD7 TOTAL SCORE: 13
2. NOT BEING ABLE TO STOP OR CONTROL WORRYING: 2
5. BEING SO RESTLESS THAT IT IS HARD TO SIT STILL: 1

## 2023-11-14 ASSESSMENT — PATIENT HEALTH QUESTIONNAIRE - PHQ9
1. LITTLE INTEREST OR PLEASURE IN DOING THINGS: 1
SUM OF ALL RESPONSES TO PHQ QUESTIONS 1-9: 8
4. FEELING TIRED OR HAVING LITTLE ENERGY: 1
SUM OF ALL RESPONSES TO PHQ QUESTIONS 1-9: 8
3. TROUBLE FALLING OR STAYING ASLEEP: 1
8. MOVING OR SPEAKING SO SLOWLY THAT OTHER PEOPLE COULD HAVE NOTICED. OR THE OPPOSITE, BEING SO FIGETY OR RESTLESS THAT YOU HAVE BEEN MOVING AROUND A LOT MORE THAN USUAL: 0
7. TROUBLE CONCENTRATING ON THINGS, SUCH AS READING THE NEWSPAPER OR WATCHING TELEVISION: 1
6. FEELING BAD ABOUT YOURSELF - OR THAT YOU ARE A FAILURE OR HAVE LET YOURSELF OR YOUR FAMILY DOWN: 1
SUM OF ALL RESPONSES TO PHQ9 QUESTIONS 1 & 2: 2
2. FEELING DOWN, DEPRESSED OR HOPELESS: 1
9. THOUGHTS THAT YOU WOULD BE BETTER OFF DEAD, OR OF HURTING YOURSELF: 1
SUM OF ALL RESPONSES TO PHQ QUESTIONS 1-9: 7
SUM OF ALL RESPONSES TO PHQ QUESTIONS 1-9: 8
5. POOR APPETITE OR OVEREATING: 1
10. IF YOU CHECKED OFF ANY PROBLEMS, HOW DIFFICULT HAVE THESE PROBLEMS MADE IT FOR YOU TO DO YOUR WORK, TAKE CARE OF THINGS AT HOME, OR GET ALONG WITH OTHER PEOPLE: 3

## 2023-11-14 ASSESSMENT — COLUMBIA-SUICIDE SEVERITY RATING SCALE - C-SSRS
BASED ON RESPONSES TO C-SSRS QS 1-6, WHAT IS THE PATIENT'S OVERALL RISK RATING FOR SUICIDE: NO RISK
7. DID THIS OCCUR IN THE LAST THREE MONTHS: NO
1. WITHIN THE PAST MONTH, HAVE YOU WISHED YOU WERE DEAD OR WISHED YOU COULD GO TO SLEEP AND NOT WAKE UP?: NO
3. HAVE YOU BEEN THINKING ABOUT HOW YOU MIGHT KILL YOURSELF?: NO
2. HAVE YOU ACTUALLY HAD ANY THOUGHTS OF KILLING YOURSELF?: NO
4. HAVE YOU HAD THESE THOUGHTS AND HAD SOME INTENTION OF ACTING ON THEM?: NO
6. HAVE YOU EVER DONE ANYTHING, STARTED TO DO ANYTHING, OR PREPARED TO DO ANYTHING TO END YOUR LIFE?: NO
5. HAVE YOU STARTED TO WORK OUT OR WORKED OUT THE DETAILS OF HOW TO KILL YOURSELF? DO YOU INTEND TO CARRY OUT THIS PLAN?: NO
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
## 2023-11-14 NOTE — CONSULTS
Session ID: [id number]  Language: [facility]   ID: #[id number]   Name: Giuliana Dunham

## 2023-11-21 ENCOUNTER — HOSPITAL ENCOUNTER (OUTPATIENT)
Dept: MAMMOGRAPHY | Age: 48
Discharge: HOME | End: 2023-11-24
Payer: COMMERCIAL

## 2023-11-21 DIAGNOSIS — N64.4 PAIN OF RIGHT BREAST: ICD-10-CM

## 2023-11-21 PROCEDURE — 77066 DX MAMMO INCL CAD BI: CPT

## 2023-12-08 RX ORDER — FAMOTIDINE 20 MG/1
20 TABLET, FILM COATED ORAL 2 TIMES DAILY
Qty: 60 TABLET | Refills: 3 | Status: CANCELLED | OUTPATIENT
Start: 2023-12-08

## 2023-12-11 ASSESSMENT — ENCOUNTER SYMPTOMS: SHORTNESS OF BREATH: 0

## 2023-12-11 NOTE — PROGRESS NOTES
Timoteo Davis (:  1975) is a 51 y.o. female,Established patient, here for evaluation of the following chief complaint(s):  Bipolar (Pt states that she started with R sided breast pain (burning sensation) that has now spread to both breasts and she read online that Roseanne Fritter can cause these side effects. )         ASSESSMENT/PLAN:  1. Schizoaffective disorder, bipolar type ([address])  2. Bruising  -     CBC with Auto Differential; Future  -     Comprehensive Metabolic Panel; Future      Patient Instructions   Reviewed potential side effects of Vraylar which does include body temperature dysregulation and increased body temperature but given that she reports feeling better emotionally I would not advise changing meds at this point but she can discuss with her mental health NP next week        Return in about 10 weeks (around 2024) for previously scheduled appt. Subjective   SUBJECTIVE/OBJECTIVE:  HPI  Patient is here for follow-up of schizoaffective disoder - bipolar type. The current state of this condition is no significant medication side effects noted and well controlled on Vraylar 4.5 mg daily + Hydroxyzine 10 mg tid prn (she reports taking only occasionally) - taking as prescribed, adverse effects: feeling hot in breasts & arms. She describes feeling much better since being on Roseanne Fritter compared to her previous treatments. Work-up included bilateral diagnostic mammogram with negative results. Of note, she also describes no further GI issues with abdominal bloating, epigastric burning, heartburn, or diarrhea with only occasional use of Famotidine. Mammogram Result (most recent):  TIM DIGITAL DIAGNOSTIC W OR WO CAD BILATERAL 2023    Narrative  STUDY:  Bilateral digital diagnostic mammogram with CAD    INDICATION: Bilateral breast pain on the right x1 month and left x1 week. Pain  is centrally located behind the nipples and extending into all quadrants.     COMPARISON:  2023,

## 2023-12-12 ENCOUNTER — OFFICE VISIT (OUTPATIENT)
Dept: INTERNAL MEDICINE CLINIC | Facility: CLINIC | Age: 48
End: 2023-12-12
Payer: COMMERCIAL

## 2023-12-12 VITALS
TEMPERATURE: 98.1 F | WEIGHT: 136 LBS | OXYGEN SATURATION: 98 % | HEART RATE: 84 BPM | HEIGHT: 59 IN | SYSTOLIC BLOOD PRESSURE: 110 MMHG | BODY MASS INDEX: 27.42 KG/M2 | DIASTOLIC BLOOD PRESSURE: 88 MMHG

## 2023-12-12 DIAGNOSIS — F25.0 SCHIZOAFFECTIVE DISORDER, BIPOLAR TYPE (HCC): Primary | ICD-10-CM

## 2023-12-12 DIAGNOSIS — T14.8XXA BRUISING: ICD-10-CM

## 2023-12-12 LAB
ALBUMIN SERPL-MCNC: 3.8 G/DL (ref 3.5–5)
ALBUMIN/GLOB SERPL: 1.1 (ref 0.4–1.6)
ALP SERPL-CCNC: 69 U/L (ref 50–136)
ALT SERPL-CCNC: 31 U/L (ref 12–65)
ANION GAP SERPL CALC-SCNC: 7 MMOL/L (ref 2–11)
AST SERPL-CCNC: 22 U/L (ref 15–37)
BASOPHILS # BLD: 0 K/UL (ref 0–0.2)
BASOPHILS NFR BLD: 0 % (ref 0–2)
BILIRUB SERPL-MCNC: 0.3 MG/DL (ref 0.2–1.1)
BUN SERPL-MCNC: 10 MG/DL (ref 6–23)
CALCIUM SERPL-MCNC: 8.9 MG/DL (ref 8.3–10.4)
CHLORIDE SERPL-SCNC: 107 MMOL/L (ref 103–113)
CO2 SERPL-SCNC: 26 MMOL/L (ref 21–32)
CREAT SERPL-MCNC: 0.5 MG/DL (ref 0.6–1)
DIFFERENTIAL METHOD BLD: NORMAL
EOSINOPHIL # BLD: 0.3 K/UL (ref 0–0.8)
EOSINOPHIL NFR BLD: 5 % (ref 0.5–7.8)
ERYTHROCYTE [DISTWIDTH] IN BLOOD BY AUTOMATED COUNT: 13.4 % (ref 11.9–14.6)
GLOBULIN SER CALC-MCNC: 3.6 G/DL (ref 2.8–4.5)
GLUCOSE SERPL-MCNC: 89 MG/DL (ref 65–100)
HCT VFR BLD AUTO: 39.7 % (ref 35.8–46.3)
HGB BLD-MCNC: 13 G/DL (ref 11.7–15.4)
IMM GRANULOCYTES # BLD AUTO: 0 K/UL (ref 0–0.5)
IMM GRANULOCYTES NFR BLD AUTO: 0 % (ref 0–5)
LYMPHOCYTES # BLD: 2.3 K/UL (ref 0.5–4.6)
LYMPHOCYTES NFR BLD: 40 % (ref 13–44)
MCH RBC QN AUTO: 29.3 PG (ref 26.1–32.9)
MCHC RBC AUTO-ENTMCNC: 32.7 G/DL (ref 31.4–35)
MCV RBC AUTO: 89.6 FL (ref 82–102)
MONOCYTES # BLD: 0.4 K/UL (ref 0.1–1.3)
MONOCYTES NFR BLD: 7 % (ref 4–12)
NEUTS SEG # BLD: 2.7 K/UL (ref 1.7–8.2)
NEUTS SEG NFR BLD: 48 % (ref 43–78)
NRBC # BLD: 0 K/UL (ref 0–0.2)
PLATELET # BLD AUTO: 260 K/UL (ref 150–450)
PMV BLD AUTO: 9.6 FL (ref 9.4–12.3)
POTASSIUM SERPL-SCNC: 4.2 MMOL/L (ref 3.5–5.1)
PROT SERPL-MCNC: 7.4 G/DL (ref 6.3–8.2)
RBC # BLD AUTO: 4.43 M/UL (ref 4.05–5.2)
SODIUM SERPL-SCNC: 140 MMOL/L (ref 136–146)
WBC # BLD AUTO: 5.6 K/UL (ref 4.3–11.1)

## 2023-12-12 PROCEDURE — 99214 OFFICE O/P EST MOD 30 MIN: CPT | Performed by: PHYSICIAN ASSISTANT

## 2023-12-12 ASSESSMENT — ENCOUNTER SYMPTOMS
ABDOMINAL DISTENTION: 0
VOMITING: 0
NAUSEA: 0
ABDOMINAL PAIN: 0
DIARRHEA: 0

## 2023-12-19 ENCOUNTER — OFFICE VISIT (OUTPATIENT)
Dept: BEHAVIORAL/MENTAL HEALTH CLINIC | Age: 48
End: 2023-12-19
Payer: MEDICAID

## 2023-12-19 VITALS
TEMPERATURE: 98.7 F | HEIGHT: 59 IN | BODY MASS INDEX: 27.82 KG/M2 | DIASTOLIC BLOOD PRESSURE: 68 MMHG | OXYGEN SATURATION: 99 % | HEART RATE: 84 BPM | WEIGHT: 138 LBS | SYSTOLIC BLOOD PRESSURE: 102 MMHG

## 2023-12-19 DIAGNOSIS — F25.0 SCHIZOAFFECTIVE DISORDER, BIPOLAR TYPE (HCC): Primary | ICD-10-CM

## 2023-12-19 PROCEDURE — 99214 OFFICE O/P EST MOD 30 MIN: CPT

## 2023-12-19 NOTE — PROGRESS NOTES
OUTPATIENT PSYCHIATRIC RETURN VISIT PROGRESS NOTE    Date of Service: 12/19/2023     Identification    Reilly Dick is a 51 y.o.  with a past psychiatric history of Schizoaffective disorder who presents today for a psychiatric follow up appointment. CC:  Routine medication management follow up. Chief Complaint   Patient presents with    Follow-up        Subjective / Interval History:   used:  Pt comes to clinic today and reports that she is irritable, angry, nervous and having difficulty sleeping x 4-5 weeks. She states that she lives in the past and repeatedly talks about it with people which makes her angry and sad. Also, she is worried about family members back in [facility] that hydroxyzine is helpful but \"very, very worried\". Pt has been medication compliant and denies any side-effects. Pt denies SI, HI, impulsiveness, hyperconfidence, hopelessness, distractibility, desire to not sleep and VH. Spoke with daughter Rhiannon Markham, pt gave permission to ensure that that pt understood switch instructions and has support, daughter speaks english/current college student, both voiced understanding to medication plan. Supportive  and daughter  Psychiatric Review Of Systems:  Sleep: \"sometimes 3 sometimes 6 hours because of being worried\" x 2-3 weeks, \"its not good\"  Appetite: ok  Current suicidal/homicidal ideations: Denies SI/ HI  Current auditory/visual hallucinations: loud knocking at door last night but  did not hear, big bangs. (Per pt  was asleep).   Denies visual hallucinations/delusions     Medications:    Current Outpatient Medications:     VITAMIN E PO, Take by mouth, Disp: , Rfl:     Ascorbic Acid (VITAMIN C PO), Take by mouth, Disp: , Rfl:     VITAMIN D PO, Take by mouth, Disp: , Rfl:     DHA-EPA-Vitamin E (OMEGA-3 COMPLEX PO), Take 1 tablet by mouth daily, Disp: , Rfl:     cariprazine hcl (VRAYLAR) 4.5 MG CAPS capsule, Take 1 capsule by mouth every morning, Disp: 30

## 2023-12-19 NOTE — PROGRESS NOTES
Patient stated that she is very irritated and angry today. Feels like she cannot stop talking. Feeling very jumpy, worried, and anxious. Having a hard time eating and sleeping due to nervousness.

## 2023-12-27 RX ORDER — RISPERIDONE 1 MG/1
TABLET ORAL
Qty: 30 TABLET | Refills: 0 | Status: SHIPPED | OUTPATIENT
Start: 2023-12-27

## 2024-01-02 ENCOUNTER — OFFICE VISIT (OUTPATIENT)
Dept: BEHAVIORAL/MENTAL HEALTH CLINIC | Age: 49
End: 2024-01-02
Payer: COMMERCIAL

## 2024-01-02 VITALS
TEMPERATURE: 97.9 F | SYSTOLIC BLOOD PRESSURE: 108 MMHG | BODY MASS INDEX: 28.43 KG/M2 | WEIGHT: 141 LBS | HEIGHT: 59 IN | OXYGEN SATURATION: 98 % | HEART RATE: 93 BPM | DIASTOLIC BLOOD PRESSURE: 70 MMHG

## 2024-01-02 DIAGNOSIS — F31.9 BIPOLAR 1 DISORDER (HCC): Primary | ICD-10-CM

## 2024-01-02 PROCEDURE — 99213 OFFICE O/P EST LOW 20 MIN: CPT

## 2024-01-02 RX ORDER — ARIPIPRAZOLE 10 MG/1
10 TABLET ORAL DAILY
COMMUNITY
End: 2024-01-02 | Stop reason: SDUPTHER

## 2024-01-02 RX ORDER — ARIPIPRAZOLE 10 MG/1
10 TABLET ORAL DAILY
Qty: 90 TABLET | Refills: 0 | Status: SHIPPED | OUTPATIENT
Start: 2024-01-02

## 2024-01-02 NOTE — PROGRESS NOTES
Patient would like to know if she can get a lab order for cholesterol or if PCP needs to do this.

## 2024-01-02 NOTE — PROGRESS NOTES
OUTPATIENT PSYCHIATRIC RETURN VISIT PROGRESS NOTE    Date of Service: 1/2/2024     Identification    Marky Garrison is a 48 y.o.  with a past psychiatric history of Bipolar/RUBI who presents today for a psychiatric follow up appointment.      CC:  Routine medication management follow up.    Chief Complaint   Patient presents with    Follow-up        Subjective / Interval History:   used:   Pt comes to clinic today and reports that has restarted her old medication and it works good. The medication is abilify 10mg (prescribe by Dr. Rodriguez 2/2023) , taking daily x 2 weeks. Says she restarted one day after last appointment, she is pleased with Abilify would like to continue (tolerated vraylar wean well, no longer taking). Says that she is sleeping better, controlled mind/\"no worrying thoughts everywhere\", denies avh and endorses a stable good mood (asked by ). She appears more calm than previous appointments, is not tearful and smiles appropriately when talking. Educated pt on importance of medication compliance and risk of not(including drug interactions ), she voiced understanding. Atarax as needed for anxiety , she takes once daily \"not everyday only if anxious\". 15-30 minutes of physical activity 3-4 days weekly. She does mention that she feels \"no anxious or irritable on Abilify\".  Pt has been medication compliant and denies any side-effects. Pt denies SI, HI and AVH. Does not endorse any manic or psychotic symptoms.    AIMS=0.      She was also educated on abilfy including s/e such as akathisia to which she voiced understanding to .   Psychiatric Review Of Systems:  Sleep: generally restful sleep 6-8 hrs nightly  Appetite: ok  Current suicidal/homicidal ideations: Denies SI/ HI  Current auditory/visual hallucinations: Denies     Medications:    Current Outpatient Medications:     VITAMIN E PO, Take by mouth, Disp: , Rfl:     Ascorbic Acid (VITAMIN C PO), Take by mouth, Disp: , Rfl:

## 2024-01-16 DIAGNOSIS — K29.50 CHRONIC GASTRITIS WITHOUT BLEEDING, UNSPECIFIED GASTRITIS TYPE: ICD-10-CM

## 2024-01-16 RX ORDER — FAMOTIDINE 20 MG/1
20 TABLET, FILM COATED ORAL 2 TIMES DAILY
Qty: 60 TABLET | Refills: 3 | OUTPATIENT
Start: 2024-01-16

## 2024-02-13 ENCOUNTER — NURSE ONLY (OUTPATIENT)
Dept: INTERNAL MEDICINE CLINIC | Facility: CLINIC | Age: 49
End: 2024-02-13

## 2024-02-13 DIAGNOSIS — E78.1 SECONDARY HYPERTRIGLYCERIDEMIA: ICD-10-CM

## 2024-02-13 LAB
CHOLEST SERPL-MCNC: 306 MG/DL
HDLC SERPL-MCNC: 52 MG/DL (ref 40–60)
HDLC SERPL: 5.9
LDLC SERPL CALC-MCNC: 231 MG/DL
TRIGL SERPL-MCNC: 115 MG/DL (ref 35–150)
VLDLC SERPL CALC-MCNC: 23 MG/DL (ref 6–23)

## 2024-02-19 ASSESSMENT — ENCOUNTER SYMPTOMS: SHORTNESS OF BREATH: 0

## 2024-02-19 NOTE — PROGRESS NOTES
Marky Garrison (:  1975) is a 48 y.o. female,Established patient, here for evaluation of the following chief complaint(s):  Secondary hypertriglyceridemia (Pt is here for a 4 month routine f/u with lab review. ) and Bipolar         ASSESSMENT/PLAN:  1. Secondary hyperlipidemia  -     Lipid Panel; Future  2. Long term current use of antipsychotic medication  -     Basic Metabolic Panel; Future  -     Lipid Panel; Future  3. Schizoaffective disorder, bipolar type (HCC)      Patient Instructions   Discussed my concerns about what Abilify appears to do to her cholesterol values which were proven to return to normal values when off Vraylar  Will work to get her established with a new psychiatry provider - hopefully we can collaborate and find a good solution for her that doesn't require statin to be started for cholesterol management of a side effect of psychotropic medication  Counseled to reduce intake of high fat/cholesterol foods such as fried foods, red meats, and certain dairy products (cheese, ice cream, butter/margarine, egg yolks, whole milk products) to help reduce cholesterol values  Recommend switching coffee creamer to 2% milk rather than sweetened condensed milk  Encouraged to aim for cardiovascular exercise x 30 minutes day      Return pending psychiatry follow-up.         Subjective   SUBJECTIVE/OBJECTIVE:  HPI  The patient is a 48 y.o. female who is seen for evaluation of hyperlipidemia.  She was tested because of secondary hypertriglyceridemia due to antipsychotic medication.  The current state of this condition is poorly controlled - not currently on medications for this problem.  She denies any big changes in her diet and generally tries to avoid eating fatty foods but does use sweetened condensed milk (2T) in her coffee every morning.  She had a similar reaction in 2023 with significant increase in triglycerides when Abilify was increased by psychiatry from 5 mg to 10 mg.  This time she

## 2024-02-20 ENCOUNTER — OFFICE VISIT (OUTPATIENT)
Dept: INTERNAL MEDICINE CLINIC | Facility: CLINIC | Age: 49
End: 2024-02-20
Payer: COMMERCIAL

## 2024-02-20 VITALS
SYSTOLIC BLOOD PRESSURE: 104 MMHG | HEART RATE: 99 BPM | OXYGEN SATURATION: 99 % | TEMPERATURE: 97.7 F | BODY MASS INDEX: 28.63 KG/M2 | WEIGHT: 142 LBS | HEIGHT: 59 IN | DIASTOLIC BLOOD PRESSURE: 80 MMHG

## 2024-02-20 DIAGNOSIS — F25.0 SCHIZOAFFECTIVE DISORDER, BIPOLAR TYPE (HCC): ICD-10-CM

## 2024-02-20 DIAGNOSIS — E78.49: Primary | ICD-10-CM

## 2024-02-20 DIAGNOSIS — Z79.899 LONG TERM CURRENT USE OF ANTIPSYCHOTIC MEDICATION: ICD-10-CM

## 2024-02-20 PROCEDURE — 99214 OFFICE O/P EST MOD 30 MIN: CPT | Performed by: PHYSICIAN ASSISTANT

## 2024-02-20 ASSESSMENT — ANXIETY QUESTIONNAIRES
GAD7 TOTAL SCORE: 5
7. FEELING AFRAID AS IF SOMETHING AWFUL MIGHT HAPPEN: 1
IF YOU CHECKED OFF ANY PROBLEMS ON THIS QUESTIONNAIRE, HOW DIFFICULT HAVE THESE PROBLEMS MADE IT FOR YOU TO DO YOUR WORK, TAKE CARE OF THINGS AT HOME, OR GET ALONG WITH OTHER PEOPLE: NOT DIFFICULT AT ALL
2. NOT BEING ABLE TO STOP OR CONTROL WORRYING: 1
6. BECOMING EASILY ANNOYED OR IRRITABLE: 1
5. BEING SO RESTLESS THAT IT IS HARD TO SIT STILL: 0
4. TROUBLE RELAXING: 0
3. WORRYING TOO MUCH ABOUT DIFFERENT THINGS: 1
1. FEELING NERVOUS, ANXIOUS, OR ON EDGE: 1

## 2024-02-20 ASSESSMENT — PATIENT HEALTH QUESTIONNAIRE - PHQ9
1. LITTLE INTEREST OR PLEASURE IN DOING THINGS: 0
10. IF YOU CHECKED OFF ANY PROBLEMS, HOW DIFFICULT HAVE THESE PROBLEMS MADE IT FOR YOU TO DO YOUR WORK, TAKE CARE OF THINGS AT HOME, OR GET ALONG WITH OTHER PEOPLE: 0
SUM OF ALL RESPONSES TO PHQ QUESTIONS 1-9: 3
SUM OF ALL RESPONSES TO PHQ QUESTIONS 1-9: 3
7. TROUBLE CONCENTRATING ON THINGS, SUCH AS READING THE NEWSPAPER OR WATCHING TELEVISION: 0
6. FEELING BAD ABOUT YOURSELF - OR THAT YOU ARE A FAILURE OR HAVE LET YOURSELF OR YOUR FAMILY DOWN: 1
8. MOVING OR SPEAKING SO SLOWLY THAT OTHER PEOPLE COULD HAVE NOTICED. OR THE OPPOSITE, BEING SO FIGETY OR RESTLESS THAT YOU HAVE BEEN MOVING AROUND A LOT MORE THAN USUAL: 0
SUM OF ALL RESPONSES TO PHQ9 QUESTIONS 1 & 2: 1
9. THOUGHTS THAT YOU WOULD BE BETTER OFF DEAD, OR OF HURTING YOURSELF: 1
SUM OF ALL RESPONSES TO PHQ QUESTIONS 1-9: 3
2. FEELING DOWN, DEPRESSED OR HOPELESS: 1
5. POOR APPETITE OR OVEREATING: 0
4. FEELING TIRED OR HAVING LITTLE ENERGY: 0
3. TROUBLE FALLING OR STAYING ASLEEP: 0
SUM OF ALL RESPONSES TO PHQ QUESTIONS 1-9: 2

## 2024-02-21 NOTE — PATIENT INSTRUCTIONS
Discussed my concerns about what Abilify appears to do to her cholesterol values which were proven to return to normal values when off Vraylar  Will work to get her established with a new psychiatry provider - hopefully we can collaborate and find a good solution for her that doesn't require statin to be started for cholesterol management of a side effect of psychotropic medication  Counseled to reduce intake of high fat/cholesterol foods such as fried foods, red meats, and certain dairy products (cheese, ice cream, butter/margarine, egg yolks, whole milk products) to help reduce cholesterol values  Recommend switching coffee creamer to 2% milk rather than sweetened condensed milk  Encouraged to aim for cardiovascular exercise x 30 minutes day

## 2024-03-15 ENCOUNTER — APPOINTMENT (OUTPATIENT)
Dept: ULTRASOUND IMAGING | Age: 49
End: 2024-03-15
Payer: COMMERCIAL

## 2024-03-15 ENCOUNTER — HOSPITAL ENCOUNTER (EMERGENCY)
Age: 49
Discharge: HOME | End: 2024-03-15
Payer: COMMERCIAL

## 2024-03-15 ENCOUNTER — APPOINTMENT (OUTPATIENT)
Dept: CT IMAGING | Age: 49
End: 2024-03-15
Payer: COMMERCIAL

## 2024-03-15 VITALS
HEART RATE: 84 BPM | RESPIRATION RATE: 16 BRPM | HEIGHT: 59 IN | BODY MASS INDEX: 28.63 KG/M2 | DIASTOLIC BLOOD PRESSURE: 75 MMHG | SYSTOLIC BLOOD PRESSURE: 116 MMHG | TEMPERATURE: 98 F | WEIGHT: 142 LBS | OXYGEN SATURATION: 98 %

## 2024-03-15 DIAGNOSIS — R10.30 LOWER ABDOMINAL PAIN: ICD-10-CM

## 2024-03-15 DIAGNOSIS — N30.90 CYSTITIS WITHOUT HEMATURIA: Primary | ICD-10-CM

## 2024-03-15 DIAGNOSIS — N83.201 RIGHT OVARIAN CYST: ICD-10-CM

## 2024-03-15 LAB
ALBUMIN SERPL-MCNC: 3.7 G/DL (ref 3.5–5)
ALBUMIN/GLOB SERPL: 0.8 (ref 0.4–1.6)
ALP SERPL-CCNC: 78 U/L (ref 50–136)
ALT SERPL-CCNC: 23 U/L (ref 12–65)
ANION GAP SERPL CALC-SCNC: 3 MMOL/L (ref 2–11)
APPEARANCE UR: CLEAR
AST SERPL-CCNC: 20 U/L (ref 15–37)
BACTERIA URNS QL MICRO: ABNORMAL /HPF
BASOPHILS # BLD: 0 K/UL (ref 0–0.2)
BASOPHILS NFR BLD: 0 % (ref 0–2)
BILIRUB SERPL-MCNC: 0.2 MG/DL (ref 0.2–1.1)
BILIRUB UR QL: ABNORMAL
BUN SERPL-MCNC: 11 MG/DL (ref 6–23)
CALCIUM SERPL-MCNC: 8.5 MG/DL (ref 8.3–10.4)
CHLORIDE SERPL-SCNC: 107 MMOL/L (ref 103–113)
CO2 SERPL-SCNC: 29 MMOL/L (ref 21–32)
COLOR UR: ABNORMAL
CREAT SERPL-MCNC: 0.65 MG/DL (ref 0.6–1)
DIFFERENTIAL METHOD BLD: ABNORMAL
EOSINOPHIL # BLD: 0.2 K/UL (ref 0–0.8)
EOSINOPHIL NFR BLD: 3 % (ref 0.5–7.8)
EPI CELLS #/AREA URNS HPF: ABNORMAL /HPF
ERYTHROCYTE [DISTWIDTH] IN BLOOD BY AUTOMATED COUNT: 13.2 % (ref 11.9–14.6)
GLOBULIN SER CALC-MCNC: 4.5 G/DL (ref 2.8–4.5)
GLUCOSE SERPL-MCNC: 95 MG/DL (ref 65–100)
GLUCOSE UR STRIP.AUTO-MCNC: NEGATIVE MG/DL
HCG UR QL: NEGATIVE
HCG UR QL: NEGATIVE
HCT VFR BLD AUTO: 40 % (ref 35.8–46.3)
HGB BLD-MCNC: 13.1 G/DL (ref 11.7–15.4)
HGB UR QL STRIP: NEGATIVE
IMM GRANULOCYTES # BLD AUTO: 0 K/UL (ref 0–0.5)
IMM GRANULOCYTES NFR BLD AUTO: 0 % (ref 0–5)
KETONES UR QL STRIP.AUTO: NEGATIVE MG/DL
LEUKOCYTE ESTERASE UR QL STRIP.AUTO: ABNORMAL
LIPASE SERPL-CCNC: 153 U/L (ref 73–393)
LYMPHOCYTES # BLD: 2.6 K/UL (ref 0.5–4.6)
LYMPHOCYTES NFR BLD: 36 % (ref 13–44)
MCH RBC QN AUTO: 28.6 PG (ref 26.1–32.9)
MCHC RBC AUTO-ENTMCNC: 32.8 G/DL (ref 31.4–35)
MCV RBC AUTO: 87.3 FL (ref 82–102)
MONOCYTES # BLD: 0.5 K/UL (ref 0.1–1.3)
MONOCYTES NFR BLD: 7 % (ref 4–12)
NEUTS SEG # BLD: 3.8 K/UL (ref 1.7–8.2)
NEUTS SEG NFR BLD: 53 % (ref 43–78)
NITRITE UR QL STRIP.AUTO: POSITIVE
NRBC # BLD: 0 K/UL (ref 0–0.2)
OTHER OBSERVATIONS: ABNORMAL
PH UR STRIP: 5 (ref 5–9)
PLATELET # BLD AUTO: 290 K/UL (ref 150–450)
PMV BLD AUTO: 8.8 FL (ref 9.4–12.3)
POTASSIUM SERPL-SCNC: 3.9 MMOL/L (ref 3.5–5.1)
PROT SERPL-MCNC: 8.2 G/DL (ref 6.3–8.2)
PROT UR STRIP-MCNC: ABNORMAL MG/DL
RBC # BLD AUTO: 4.58 M/UL (ref 4.05–5.2)
RBC #/AREA URNS HPF: ABNORMAL /HPF
SODIUM SERPL-SCNC: 139 MMOL/L (ref 136–146)
SP GR UR REFRACTOMETRY: 1.01 (ref 1–1.02)
UROBILINOGEN UR QL STRIP.AUTO: 1 EU/DL (ref 0.2–1)
WBC # BLD AUTO: 7.2 K/UL (ref 4.3–11.1)
WBC URNS QL MICRO: ABNORMAL /HPF

## 2024-03-15 PROCEDURE — 6360000004 HC RX CONTRAST MEDICATION

## 2024-03-15 PROCEDURE — 80053 COMPREHEN METABOLIC PANEL: CPT

## 2024-03-15 PROCEDURE — 6370000000 HC RX 637 (ALT 250 FOR IP)

## 2024-03-15 PROCEDURE — 83690 ASSAY OF LIPASE: CPT

## 2024-03-15 PROCEDURE — 87086 URINE CULTURE/COLONY COUNT: CPT

## 2024-03-15 PROCEDURE — 99285 EMERGENCY DEPT VISIT HI MDM: CPT

## 2024-03-15 PROCEDURE — 74177 CT ABD & PELVIS W/CONTRAST: CPT

## 2024-03-15 PROCEDURE — 76830 TRANSVAGINAL US NON-OB: CPT

## 2024-03-15 PROCEDURE — 81025 URINE PREGNANCY TEST: CPT

## 2024-03-15 PROCEDURE — 85025 COMPLETE CBC W/AUTO DIFF WBC: CPT

## 2024-03-15 PROCEDURE — 81001 URINALYSIS AUTO W/SCOPE: CPT

## 2024-03-15 RX ORDER — NITROFURANTOIN 25; 75 MG/1; MG/1
100 CAPSULE ORAL 2 TIMES DAILY
Qty: 14 CAPSULE | Refills: 0 | Status: SHIPPED | OUTPATIENT
Start: 2024-03-15 | End: 2024-03-22

## 2024-03-15 RX ORDER — HYDROCODONE BITARTRATE AND ACETAMINOPHEN 10; 325 MG/1; MG/1
1 TABLET ORAL
Status: COMPLETED | OUTPATIENT
Start: 2024-03-15 | End: 2024-03-15

## 2024-03-15 RX ADMIN — IOPAMIDOL 100 ML: 755 INJECTION, SOLUTION INTRAVENOUS at 20:11

## 2024-03-15 RX ADMIN — HYDROCODONE BITARTRATE AND ACETAMINOPHEN 1 TABLET: 10; 325 TABLET ORAL at 21:52

## 2024-03-15 ASSESSMENT — LIFESTYLE VARIABLES
HOW MANY STANDARD DRINKS CONTAINING ALCOHOL DO YOU HAVE ON A TYPICAL DAY: PATIENT DOES NOT DRINK
HOW OFTEN DO YOU HAVE A DRINK CONTAINING ALCOHOL: NEVER

## 2024-03-15 ASSESSMENT — PAIN DESCRIPTION - LOCATION: LOCATION: ABDOMEN

## 2024-03-15 ASSESSMENT — PAIN SCALES - GENERAL
PAINLEVEL_OUTOF10: 5
PAINLEVEL_OUTOF10: 8

## 2024-03-15 NOTE — ED TRIAGE NOTES
Pt reports lower abdominal pain and nausea x 1 week. Pt sent here from urgent care.    Alyssa Mosquera RN

## 2024-03-15 NOTE — ED PROVIDER NOTES
Urine SMALL (A) NEG      WBC, UA 3-5 0 /hpf    RBC, UA 0-3 0 /hpf    Epithelial Cells UA 10-20 0 /hpf    BACTERIA, URINE 1+ (H) 0 /hpf    Other observations RESULTS VERIFIED MANUALLY     Pregnancy, Urine   Result Value Ref Range    HCG(Urine) Pregnancy Test Negative NEG     POC Pregnancy Urine Qual   Result Value Ref Range    Preg Test, Ur Negative NEG           CT ABDOMEN PELVIS W IV CONTRAST Additional Contrast? None   Final Result   1. There is thickening of the third and fourth portions of the duodenum. This   could be further evaluated with endoscopy.   2. There is prominence in the endometrium. This may be related to patient's   menstrual cycle. Further correlation is recommended. However to the cranial   portion of the uterus there are small bowel loops which appear to drape the   uterus. Furthermore an amorphous region measuring 1.6 cm in size could represent   a subtle loop of bowel however a right adnexal process remains a possibility.   Endovaginal sonographic evaluation would be advised.         If there are any questions about this report, I can be reached on [employer]   or at [phone]         US PELVIS COMPLETE NON-OB TRANSABDOMINAL AND TRANSVAGINAL    (Results Pending)                No results for input(s): \"COVID19\" in the last 72 hours.     Voice dictation software was used during the making of this note.  This software is not perfect and grammatical and other typographical errors may be present.  This note has not been completely proofread for errors.     Chris Guo PA  03/15/24 5251

## 2024-03-16 NOTE — DISCHARGE INSTRUCTIONS
You have a urinary tract infection.  Take the Vantin antibiotics twice daily for the next 7 days.  Your urine will be cultured and we will let you know if your antibiotic needs to be changed.  Return if you have any new or worsening symptoms.    B?n b? burton?m trùng ???ng ti?t ni?u. Dùng kháng sinh Vantin rosmery l?n m?i ngày dania 7 ngày ti?p dorcas. N??c ti?u c?a b?n s? ???c c?y keith chua tôi s? cho b?n bi?t n?u thu?c jerome sinh c?a b?n c?n ???c thay ??i. Betty joaquim l?i n?u b?n có b?t k? tri?u ch?ng m?i ho?c tr?m tr?ng h?n.

## 2024-03-16 NOTE — ED NOTES
Emergency Department Provider Signout / Continuation of Care Note         DISPOSITION         ICD-10-CM    1. Cystitis without hematuria  N30.90       2. Lower abdominal pain  R10.30           The patient's care was signed out to me at shift change.      Final Plan      Patient is a 48-year-old female who is presenting with persistent lower abdominal pain.  Received signout from CAYLA Perez, dispo pending pelvic ultrasound.    Pelvic ultrasound shows: Right ovary with 2 cm dominant follicle/cyst, no follow-up recommended.     Redemonstrated endometrium measuring 1.2 cm. If patient is postmenopausal,  recommend OB/GYN consultation.    Upon reevaluation patient resting comfortably in bed.  Discussed all result findings using  services.  She does appear to have a urinary tract infection and is being sent home with antibiotic.  Will also provide information for outpatient referral to OB/GYN for further evaluation of symptoms.  Discussed reasons to return to the ED.  All agreeable to plan.  ED Course as of 03/15/24 2303   Fri Mar 15, 2024   2136 Patient requesting oral pain medication. [ET]      ED Course User Index  [ET] Chris Guo PA Schuster, Juliana, PA  03/15/24 1489

## 2024-03-16 NOTE — CONSULTS
Session ID: [id number]  Language: Salvadorean   ID: #[id number]   Name: [facility] Units: 0 Post-Care Instructions: Patient instructed to not lie down for 4 hours and limit physical activity for 24 hours. Patient instructed not to travel by airplane for 48 hours. Show Masseter Units: Yes Additional Area 1 Location: Face Show Right And Left Brow Units: No Additional Area 1 Units: 34 Expiration Date (Month Year): 5/23 Detail Level: Zone Additional Area 2 Location: Upper cutaneous lip Price (Use Numbers Only, No Special Characters Or $): 634 Lot #: [id number] Dilution (U/0.1 Cc): 1 Consent: Written consent obtained. Risks include but not limited to lid/brow ptosis, bruising, swelling, diplopia, temporary effect, incomplete chemical denervation.

## 2024-03-16 NOTE — DISCHARGE INSTR - COC
Continuity of Care Form    Patient Name: Marky Garrison   :  1975  MRN:  [id number]    Admit date:  3/15/2024  Discharge date:  ***    Code Status Order: No Order   Advance Directives:     Admitting Physician:  No admitting provider for patient encounter.  PCP: Cate Galvez APRN - CNP    Discharging Nurse: ***  Discharging Hospital Unit/Room#: ER08/08  Discharging Unit Phone Number: ***    Emergency Contact:   Extended Emergency Contact Information  Primary Emergency Contact: Hansel Arriaga  Address:  [address]           [place] [address] [facility]  Home Phone: [phone]  Relation: Child  Secondary Emergency Contact: Ashtyn Arriaga  Address:  [place] [address] [facility]  Home Phone: [phone]  Relation: Child    Past Surgical History:  History reviewed. No pertinent surgical history.    Immunization History:   Immunization History   Administered Date(s) Administered    COVID-19, MODERNA BLUE border, Primary or Immunocompromised, (age 12y+), IM, 100 mcg/0.5mL 2021, 2021, 2021    COVID-19, MODERNA Bivalent, (age 12y+), IM, 50 mcg/0.5 mL 2023    Influenza Quadv 2019    Influenza Virus Vaccine 2015, 2016, 2017, 10/12/2018, 2020, 2021    Influenza, AFLURIA (age 3 yrs+), FLUZONE, (age 6 mo+), MDV, 0.5mL 2015    Influenza, FLUARIX, FLULAVAL, FLUZONE (age 6 mo+) AND AFLURIA, (age 3 y+), PF, 0.5mL 2016, 2017, 2020    Influenza, FLUCELVAX, (age 6 mo+), MDCK, MDV, 0.5mL 10/12/2018    Influenza, FLUCELVAX, (age 6 mo+), MDCK, PF, 0.5mL 10/12/2018, 2022, 2023    Influenza, Trivalent PF 2015       Active Problems:  Patient Active Problem List   Diagnosis Code    Lumbago M54.50    Right tennis elbow M77.11    Arthropathy M12.9    Hypercholesterolemia E78.00    Allergic rhinitis J30.9    Well woman exam Z01.419    Bipolar 1 disorder (HCC) F31.9    PTSD (post-traumatic

## 2024-03-16 NOTE — ED NOTES
I have reviewed discharge instructions with the patient.  The patient verbalized understanding.    Patient left ED via Discharge Method: ambulatory to Home with self.  Opportunity for questions and clarification provided.       Patient given 1 scripts.         To continue your aftercare when you leave the hospital, you may receive an automated call from our care team to check in on how you are doing.  This is a free service and part of our promise to provide the best care and service to meet your aftercare needs.” If you have questions, or wish to unsubscribe from this service please call [phone].  Thank you for Choosing our [facility] Emergency Department.

## 2024-03-17 LAB
BACTERIA SPEC CULT: NORMAL
SERVICE CMNT-IMP: NORMAL

## 2024-03-18 LAB
BACTERIA SPEC CULT: NORMAL
SERVICE CMNT-IMP: NORMAL

## 2024-03-25 ENCOUNTER — OFFICE VISIT (OUTPATIENT)
Dept: OBGYN CLINIC | Age: 49
End: 2024-03-25
Payer: COMMERCIAL

## 2024-03-25 VITALS
BODY MASS INDEX: 29.33 KG/M2 | WEIGHT: 145.5 LBS | DIASTOLIC BLOOD PRESSURE: 66 MMHG | SYSTOLIC BLOOD PRESSURE: 112 MMHG | HEIGHT: 59 IN

## 2024-03-25 DIAGNOSIS — R30.0 BURNING WITH URINATION: Primary | ICD-10-CM

## 2024-03-25 PROCEDURE — 99204 OFFICE O/P NEW MOD 45 MIN: CPT | Performed by: OBSTETRICS & GYNECOLOGY

## 2024-03-25 NOTE — PROGRESS NOTES
The patient is a 48 y.o.  who is seen for an ER follow up from 3/15/24.    Presented with concerns of 10-day history of lower abdominal pain, nausea. Pt will take advil for the pain and it does help some. The pain has gotten a little better since the ER visit.   She was given a Rx for augmentin to take for UTI  BID X 7 days and has completed that.   Still having occ menses, not predictable- spacing out  US TA and TV:   FINDINGS:     Uterus: 10.3 x 4.9 x 5.9 cm.  Endometrium: 1.2 cm in double layer thickness.     Right ovary: 3.3 x 1.7 x 1.8 cm. 1.3 x 1.4 x 2.0 cm dominant follicle/cyst.  Vascular flow detected.     Left ovary: 2.6 x 1.5 x 1.9 cm. No mass. Vascular flow detected.     Fluid: No significant free fluid.        IMPRESSION:  Right ovary with 2 cm dominant follicle/cyst, no follow-up recommended.     Redemonstrated endometrium measuring 1.2 cm    CT: 3/15/24  FINDINGS:  - LUNG BASES: No infiltrates or masses.     - LIVER: Normal in size and appearance.    - GALLBLADDER/BILE DUCTS: No gallstones or bile duct dilatation.  - PANCREAS: Normal.  - SPLEEN: Normal.     - ADRENALS: Normal.  - KIDNEYS/URETERS: No hydronephrosis or significant mass.  - BLADDER: Normal.  - REPRODUCTIVE ORGANS: The uterus appears to demonstrate low-attenuation central  material which could be related to a significantly thickened endometrium. In  addition there appears to be a low-attenuation vague structure surrounded by  small bowel loops thus inconspicuous in somewhat amorphous in nature measuring  1.6 cm in the greatest dimension.     - BOWEL: There appears to be moderate thickening of the third and fourth  portions of the duodenum.  - LYMPH NODES: No significant retroperitoneal, mesenteric, or pelvic adenopathy.  - BONES: No fracture or significant bone lesion.  - VASCULATURE: Premature atheromatous disease.  - OTHER: No ascites.     IMPRESSION:  1. There is thickening of the third and fourth portions of the duodenum.

## 2024-04-12 ENCOUNTER — HOSPITAL ENCOUNTER (EMERGENCY)
Age: 49
Discharge: HOME | End: 2024-04-12
Payer: COMMERCIAL

## 2024-04-12 ENCOUNTER — APPOINTMENT (OUTPATIENT)
Dept: CT IMAGING | Age: 49
End: 2024-04-12
Payer: COMMERCIAL

## 2024-04-12 VITALS
BODY MASS INDEX: 28.22 KG/M2 | HEIGHT: 59 IN | WEIGHT: 140 LBS | DIASTOLIC BLOOD PRESSURE: 56 MMHG | SYSTOLIC BLOOD PRESSURE: 108 MMHG | HEART RATE: 89 BPM | OXYGEN SATURATION: 96 % | TEMPERATURE: 97 F | RESPIRATION RATE: 16 BRPM

## 2024-04-12 DIAGNOSIS — R10.30 LOWER ABDOMINAL PAIN: Primary | ICD-10-CM

## 2024-04-12 LAB
ALBUMIN SERPL-MCNC: 3.4 G/DL (ref 3.5–5)
ALBUMIN/GLOB SERPL: 0.8 (ref 0.4–1.6)
ALP SERPL-CCNC: 70 U/L (ref 50–136)
ALT SERPL-CCNC: 29 U/L (ref 12–65)
ANION GAP SERPL CALC-SCNC: 6 MMOL/L (ref 2–11)
AST SERPL-CCNC: 22 U/L (ref 15–37)
BACTERIA URNS QL MICRO: NEGATIVE /HPF
BASOPHILS # BLD: 0 K/UL (ref 0–0.2)
BASOPHILS NFR BLD: 0 % (ref 0–2)
BILIRUB SERPL-MCNC: 0.2 MG/DL (ref 0.2–1.1)
BILIRUB UR QL: NEGATIVE
BUN SERPL-MCNC: 11 MG/DL (ref 6–23)
CALCIUM SERPL-MCNC: 8.4 MG/DL (ref 8.3–10.4)
CASTS URNS QL MICRO: NORMAL /LPF
CHLORIDE SERPL-SCNC: 109 MMOL/L (ref 103–113)
CO2 SERPL-SCNC: 26 MMOL/L (ref 21–32)
CREAT SERPL-MCNC: 0.69 MG/DL (ref 0.6–1)
CRP SERPL-MCNC: <0.3 MG/DL (ref 0–0.9)
DIFFERENTIAL METHOD BLD: ABNORMAL
EOSINOPHIL # BLD: 0.6 K/UL (ref 0–0.8)
EOSINOPHIL NFR BLD: 6 % (ref 0.5–7.8)
EPI CELLS #/AREA URNS HPF: NORMAL /HPF
ERYTHROCYTE [DISTWIDTH] IN BLOOD BY AUTOMATED COUNT: 13.4 % (ref 11.9–14.6)
GLOBULIN SER CALC-MCNC: 4.2 G/DL (ref 2.8–4.5)
GLUCOSE SERPL-MCNC: 136 MG/DL (ref 65–100)
GLUCOSE UR QL STRIP.AUTO: NEGATIVE MG/DL
HCG UR QL: NEGATIVE
HCT VFR BLD AUTO: 40.5 % (ref 35.8–46.3)
HGB BLD-MCNC: 13.5 G/DL (ref 11.7–15.4)
IMM GRANULOCYTES # BLD AUTO: 0.1 K/UL (ref 0–0.5)
IMM GRANULOCYTES NFR BLD AUTO: 1 % (ref 0–5)
KETONES UR-MCNC: NEGATIVE MG/DL
LEUKOCYTE ESTERASE UR QL STRIP: NEGATIVE
LIPASE SERPL-CCNC: 128 U/L (ref 73–393)
LYMPHOCYTES # BLD: 3.3 K/UL (ref 0.5–4.6)
LYMPHOCYTES NFR BLD: 32 % (ref 13–44)
MCH RBC QN AUTO: 28.8 PG (ref 26.1–32.9)
MCHC RBC AUTO-ENTMCNC: 33.3 G/DL (ref 31.4–35)
MCV RBC AUTO: 86.5 FL (ref 82–102)
MONOCYTES # BLD: 0.5 K/UL (ref 0.1–1.3)
MONOCYTES NFR BLD: 5 % (ref 4–12)
NEUTS SEG # BLD: 5.7 K/UL (ref 1.7–8.2)
NEUTS SEG NFR BLD: 56 % (ref 43–78)
NITRITE UR QL: NEGATIVE
NRBC # BLD: 0 K/UL (ref 0–0.2)
PH UR: 5.5 (ref 5–9)
PLATELET # BLD AUTO: 306 K/UL (ref 150–450)
PMV BLD AUTO: 8.7 FL (ref 9.4–12.3)
POTASSIUM SERPL-SCNC: 3.7 MMOL/L (ref 3.5–5.1)
PROT SERPL-MCNC: 7.6 G/DL (ref 6.3–8.2)
PROT UR QL: NEGATIVE MG/DL
RBC # BLD AUTO: 4.68 M/UL (ref 4.05–5.2)
RBC # UR STRIP: ABNORMAL
RBC #/AREA URNS HPF: NORMAL /HPF
SERVICE CMNT-IMP: ABNORMAL
SODIUM SERPL-SCNC: 141 MMOL/L (ref 136–146)
SP GR UR: 1.01 (ref 1–1.02)
UROBILINOGEN UR QL: 0.2 EU/DL (ref 0.2–1)
WBC # BLD AUTO: 10.2 K/UL (ref 4.3–11.1)
WBC URNS QL MICRO: NORMAL /HPF

## 2024-04-12 PROCEDURE — 2580000003 HC RX 258

## 2024-04-12 PROCEDURE — 6360000004 HC RX CONTRAST MEDICATION

## 2024-04-12 PROCEDURE — 81015 MICROSCOPIC EXAM OF URINE: CPT

## 2024-04-12 PROCEDURE — 99285 EMERGENCY DEPT VISIT HI MDM: CPT

## 2024-04-12 PROCEDURE — 81003 URINALYSIS AUTO W/O SCOPE: CPT

## 2024-04-12 PROCEDURE — 80053 COMPREHEN METABOLIC PANEL: CPT

## 2024-04-12 PROCEDURE — 6360000002 HC RX W HCPCS

## 2024-04-12 PROCEDURE — 85025 COMPLETE CBC W/AUTO DIFF WBC: CPT

## 2024-04-12 PROCEDURE — 86140 C-REACTIVE PROTEIN: CPT

## 2024-04-12 PROCEDURE — 81025 URINE PREGNANCY TEST: CPT

## 2024-04-12 PROCEDURE — 74177 CT ABD & PELVIS W/CONTRAST: CPT

## 2024-04-12 PROCEDURE — 83690 ASSAY OF LIPASE: CPT

## 2024-04-12 PROCEDURE — 96374 THER/PROPH/DIAG INJ IV PUSH: CPT

## 2024-04-12 RX ORDER — 0.9 % SODIUM CHLORIDE 0.9 %
1000 INTRAVENOUS SOLUTION INTRAVENOUS ONCE
Status: COMPLETED | OUTPATIENT
Start: 2024-04-12 | End: 2024-04-12

## 2024-04-12 RX ORDER — MORPHINE SULFATE 4 MG/ML
4 INJECTION INTRAVENOUS ONCE
Status: COMPLETED | OUTPATIENT
Start: 2024-04-12 | End: 2024-04-12

## 2024-04-12 RX ORDER — IBUPROFEN 600 MG/1
600 TABLET ORAL 3 TIMES DAILY PRN
Qty: 30 TABLET | Refills: 0 | Status: SHIPPED | OUTPATIENT
Start: 2024-04-12

## 2024-04-12 RX ORDER — HYDROCODONE BITARTRATE AND ACETAMINOPHEN 5; 325 MG/1; MG/1
1 TABLET ORAL EVERY 6 HOURS PRN
Qty: 12 TABLET | Refills: 0 | Status: SHIPPED | OUTPATIENT
Start: 2024-04-12 | End: 2024-04-15

## 2024-04-12 RX ADMIN — MORPHINE SULFATE 4 MG: 4 INJECTION INTRAVENOUS at 22:23

## 2024-04-12 RX ADMIN — IOPAMIDOL 100 ML: 755 INJECTION, SOLUTION INTRAVENOUS at 22:27

## 2024-04-12 RX ADMIN — SODIUM CHLORIDE 1000 ML: 9 INJECTION, SOLUTION INTRAVENOUS at 22:23

## 2024-04-12 ASSESSMENT — PAIN - FUNCTIONAL ASSESSMENT: PAIN_FUNCTIONAL_ASSESSMENT: 0-10

## 2024-04-12 ASSESSMENT — PAIN DESCRIPTION - LOCATION
LOCATION: PELVIS
LOCATION: ABDOMEN

## 2024-04-12 ASSESSMENT — PAIN DESCRIPTION - ORIENTATION: ORIENTATION: LOWER;MID

## 2024-04-12 ASSESSMENT — PAIN DESCRIPTION - DESCRIPTORS: DESCRIPTORS: DISCOMFORT

## 2024-04-12 ASSESSMENT — PAIN SCALES - GENERAL
PAINLEVEL_OUTOF10: 4
PAINLEVEL_OUTOF10: 4
PAINLEVEL_OUTOF10: 8

## 2024-04-12 ASSESSMENT — PAIN DESCRIPTION - PAIN TYPE: TYPE: ACUTE PAIN

## 2024-04-12 NOTE — ED TRIAGE NOTES
Lower abd pain times 1 mth denies n/v/d has typical BM, pain worse when walking ,  denies urinary s/s     Patient completed her antibiotic today it was given for a sinus infection

## 2024-04-13 NOTE — DISCHARGE INSTRUCTIONS
Labs and CT without any concerning findings.  I have provided a prescription for pain medication as detailed below until she can follow-up with OB/GYN on Monday.    -Ibuprofen (Advil/Motrin): Anti-inflammatory for mild to moderate pain.  Take with food.  Do not take with other NSAIDs such as naproxen (Aleve), meloxicam.    -Hydrocodone/acetaminophen (Norco): Narcotic pain medication for moderate to severe pain.  May cause drowsiness and impair ability to drive.  Use sparingly.      Please return any new or worsening symptoms in the interim.

## 2024-04-13 NOTE — ED PROVIDER NOTES
g/dL    Globulin 4.2 2.8 - 4.5 g/dL    Albumin/Globulin Ratio 0.8 0.4 - 1.6     Lipase   Result Value Ref Range    Lipase 128 73 - 393 U/L   C-Reactive Protein   Result Value Ref Range    CRP <0.3 0.0 - 0.9 mg/dL   Urinalysis, Micro   Result Value Ref Range    WBC, UA 0-4 U4 /hpf    RBC, UA 0-5 U5 /hpf    Epithelial Cells UA 0-5 U5 /hpf    BACTERIA, URINE Negative NEG /hpf    Casts 0-2 U2 /lpf   POCT Urinalysis no Micro   Result Value Ref Range    Specific Gravity, Urine, POC 1.015 1.001 - 1.023      pH, Urine, POC 5.5 5.0 - 9.0      Protein, Urine, POC Negative NEG mg/dL    Glucose, UA POC Negative NEG mg/dL    Ketones, Urine, POC Negative NEG mg/dL    Bilirubin, Urine, POC Negative NEG      Blood, UA POC SMALL (A) NEG      URINE UROBILINOGEN POC 0.2 0.2 - 1.0 EU/dL    Nitrite, Urine, POC Negative NEG      Leukocyte Est, UA POC Negative NEG      Performed by: Alyssa Campos    POC Pregnancy Urine Qual   Result Value Ref Range    Preg Test, Ur Negative NEG           CT ABDOMEN PELVIS W IV CONTRAST Additional Contrast? None   Final Result   No evidence of acute intra-abdominal pathology.         If there are any questions about this report, I can be reached on [employer]   or at [phone]                      No results for input(s): \"COVID19\" in the last 72 hours.    Voice dictation software was used during the making of this note.  This software is not perfect and grammatical and other typographical errors may be present.  This note has not been completely proofread for errors.       Sommer Desouza PA  04/13/24 6076

## 2024-04-15 ENCOUNTER — OFFICE VISIT (OUTPATIENT)
Dept: OBGYN CLINIC | Age: 49
End: 2024-04-15
Payer: COMMERCIAL

## 2024-04-15 ENCOUNTER — PROCEDURE VISIT (OUTPATIENT)
Dept: OBGYN CLINIC | Age: 49
End: 2024-04-15
Payer: COMMERCIAL

## 2024-04-15 VITALS — HEIGHT: 59 IN | WEIGHT: 145.5 LBS | BODY MASS INDEX: 29.33 KG/M2

## 2024-04-15 DIAGNOSIS — R10.2 PELVIC PAIN: Primary | ICD-10-CM

## 2024-04-15 DIAGNOSIS — F31.9 BIPOLAR 1 DISORDER (HCC): ICD-10-CM

## 2024-04-15 LAB
BILIRUBIN, URINE, POC: NEGATIVE
BLOOD URINE, POC: NORMAL
GLUCOSE URINE, POC: NEGATIVE
KETONES, URINE, POC: NEGATIVE
LEUKOCYTE ESTERASE, URINE, POC: NEGATIVE
NITRITE, URINE, POC: NEGATIVE
PH, URINE, POC: 6 (ref 4.6–8)
PROTEIN,URINE, POC: NEGATIVE
SPECIFIC GRAVITY, URINE, POC: 1.02 (ref 1–1.03)
URINALYSIS CLARITY, POC: CLEAR
URINALYSIS COLOR, POC: YELLOW
UROBILINOGEN, POC: NORMAL

## 2024-04-15 PROCEDURE — 99214 OFFICE O/P EST MOD 30 MIN: CPT | Performed by: OBSTETRICS & GYNECOLOGY

## 2024-04-15 PROCEDURE — 76830 TRANSVAGINAL US NON-OB: CPT | Performed by: OBSTETRICS & GYNECOLOGY

## 2024-04-15 PROCEDURE — 81003 URINALYSIS AUTO W/O SCOPE: CPT | Performed by: OBSTETRICS & GYNECOLOGY

## 2024-04-15 NOTE — PROGRESS NOTES
The patient is a 48 y.o.  who is seen for an ultrasound and visit for pelvic pain. Pt was seen in ER on 24 with same complaints. CT scan performed (see below for results).     Ultrasound Findings Today:  CX APPEARS WNL   UTERUS ANTEVERTED AND HETEROGENOUS   ENDO= 5.6 MM, NO INTRACAVITY MASSES VISUALIZED   ROV VISUALIZED WITH FOLLICLES AND WNL   LOV VISUALIZED WITH FOLLICLES AND WNL   NO ADN MASSES OR FREE FLUID VISUALIZED.     CT Scan from 24:  FINDINGS:  - LUNG BASES: No infiltrates or masses.  - LIVER: Normal in size and appearance.    - GALLBLADDER/BILE DUCTS: No gallstones or bile duct dilatation.  - PANCREAS: Normal.  - SPLEEN: Normal.  - ADRENALS: Normal.  - KIDNEYS/URETERS: No hydronephrosis or significant mass.  - BLADDER: Normal.  - REPRODUCTIVE ORGANS: No pelvic masses.  - BOWEL: Normal caliber.  No inflammatory changes.  - LYMPH NODES: No significant retroperitoneal, mesenteric, or pelvic adenopathy.  - BONES: No fracture or significant bone lesion.  - VASCULATURE: Normal  - OTHER: No ascites.     IMPRESSION:  No evidence of acute intra-abdominal pathology.    HISTORY:      Patient's last menstrual period was 2024 (exact date).    ROS:  Feeling well. No dyspnea or chest pain on exertion.  Some minimal abdominal pain. No change in bowel habits, black or bloody stools.  No urinary tract symptoms. GYN ROS: no breast pain or new or enlarging lumps on self exam.    PHYSICAL EXAM:  Last menstrual period 2024.    The patient appears well, alert, oriented x 3, in no distress.      ASSESSMENT:    Pelvic pain better but seems to have suprapubic pain c/w UTI, she was treated for this last month; so sending culture today. The U/S shows that the right ovarian cyst has resolved. She is reassured    On line  present throughout discussion.  PLAN:  All questions answered  Diagnosis explained in detail, including differential  Follow-up as needed  Urine culture and

## 2024-04-19 DIAGNOSIS — Z13.220 SCREENING FOR LIPID DISORDERS: Primary | ICD-10-CM

## 2024-04-19 LAB
BACTERIA SPEC CULT: NORMAL
SERVICE CMNT-IMP: NORMAL

## 2024-04-22 ENCOUNTER — NURSE ONLY (OUTPATIENT)
Dept: OBGYN CLINIC | Age: 49
End: 2024-04-22

## 2024-04-22 DIAGNOSIS — Z13.220 SCREENING FOR LIPID DISORDERS: ICD-10-CM

## 2024-04-22 LAB
CHOLEST SERPL-MCNC: 323 MG/DL
HDLC SERPL-MCNC: 54 MG/DL (ref 40–60)
HDLC SERPL: 6
LDLC SERPL CALC-MCNC: 214.8 MG/DL
TRIGL SERPL-MCNC: 271 MG/DL (ref 35–150)
VLDLC SERPL CALC-MCNC: 54.2 MG/DL (ref 6–23)

## 2024-04-24 ENCOUNTER — TELEPHONE (OUTPATIENT)
Dept: OBGYN CLINIC | Age: 49
End: 2024-04-24

## 2024-04-24 NOTE — TELEPHONE ENCOUNTER
----- Message from Carlos Burton MD sent at 4/24/2024 10:00 AM EDT -----  Regarding: lipids  Her cholesterol is very high so needs to see her primary care doc  ----- Message -----  From: Nighat Hobbs [employer] [place] [employer]  Sent: 4/22/2024   7:10 PM EDT  To: Carlos Burton MD

## 2024-06-13 ENCOUNTER — OFFICE VISIT (OUTPATIENT)
Dept: BEHAVIORAL/MENTAL HEALTH CLINIC | Age: 49
End: 2024-06-13

## 2024-06-13 VITALS
HEART RATE: 88 BPM | SYSTOLIC BLOOD PRESSURE: 108 MMHG | TEMPERATURE: 98.6 F | WEIGHT: 148 LBS | BODY MASS INDEX: 29.89 KG/M2 | OXYGEN SATURATION: 98 % | DIASTOLIC BLOOD PRESSURE: 74 MMHG

## 2024-06-13 DIAGNOSIS — F41.1 GENERALIZED ANXIETY DISORDER: ICD-10-CM

## 2024-06-13 DIAGNOSIS — F25.0 SCHIZOAFFECTIVE DISORDER, BIPOLAR TYPE (HCC): ICD-10-CM

## 2024-06-13 DIAGNOSIS — F43.10 PTSD (POST-TRAUMATIC STRESS DISORDER): Primary | ICD-10-CM

## 2024-06-13 ASSESSMENT — PATIENT HEALTH QUESTIONNAIRE - PHQ9
5. POOR APPETITE OR OVEREATING: NOT AT ALL
1. LITTLE INTEREST OR PLEASURE IN DOING THINGS: MORE THAN HALF THE DAYS
SUM OF ALL RESPONSES TO PHQ QUESTIONS 1-9: 13
SUM OF ALL RESPONSES TO PHQ QUESTIONS 1-9: 13
4. FEELING TIRED OR HAVING LITTLE ENERGY: SEVERAL DAYS
SUM OF ALL RESPONSES TO PHQ QUESTIONS 1-9: 13
10. IF YOU CHECKED OFF ANY PROBLEMS, HOW DIFFICULT HAVE THESE PROBLEMS MADE IT FOR YOU TO DO YOUR WORK, TAKE CARE OF THINGS AT HOME, OR GET ALONG WITH OTHER PEOPLE: VERY DIFFICULT
7. TROUBLE CONCENTRATING ON THINGS, SUCH AS READING THE NEWSPAPER OR WATCHING TELEVISION: MORE THAN HALF THE DAYS
6. FEELING BAD ABOUT YOURSELF - OR THAT YOU ARE A FAILURE OR HAVE LET YOURSELF OR YOUR FAMILY DOWN: SEVERAL DAYS
2. FEELING DOWN, DEPRESSED OR HOPELESS: NEARLY EVERY DAY
9. THOUGHTS THAT YOU WOULD BE BETTER OFF DEAD, OR OF HURTING YOURSELF: NOT AT ALL
SUM OF ALL RESPONSES TO PHQ9 QUESTIONS 1 & 2: 5
3. TROUBLE FALLING OR STAYING ASLEEP: MORE THAN HALF THE DAYS
SUM OF ALL RESPONSES TO PHQ QUESTIONS 1-9: 13
8. MOVING OR SPEAKING SO SLOWLY THAT OTHER PEOPLE COULD HAVE NOTICED. OR THE OPPOSITE, BEING SO FIGETY OR RESTLESS THAT YOU HAVE BEEN MOVING AROUND A LOT MORE THAN USUAL: MORE THAN HALF THE DAYS

## 2024-06-20 ENCOUNTER — TELEPHONE (OUTPATIENT)
Dept: INTERNAL MEDICINE CLINIC | Facility: CLINIC | Age: 49
End: 2024-06-20

## 2024-06-20 NOTE — TELEPHONE ENCOUNTER
----- Message from Racquel Wynn PA-C sent at 6/20/2024  5:06 PM EDT -----  Looks like she's seen Cate Galvez recently and gotten established with her - meds were changed so let's get her back on my schedule in 3 months or so for routine f/u with fasting labs 3-7 days prior.  Maybe use  to make phone call to the patient and arrange this.    Racquel  ----- Message -----  From: Violeta Rosenberg MA  Sent: 3/6/2024  11:50 AM EDT  To: TREMAINE Melton Karen will see the patient. Unfortunately she is booking into December. :(    Violeta  ----- Message -----  From: Racquel Wynn PA-C  Sent: 2/20/2024  10:19 PM EST  To: Violeta Rosenberg MA    So how would I go about finding out if Cate Galvez would accept this patient?  She was seeing Reyes Lee but she has left and patient needs a psych provider.  I'd really prefer to keep within the [facility] system since there is already a language barrier and it is so helpful to be able to read the notes and know what they did or what they have her on.  When seen outside of our system we rarely get records from psych so details are not known regarding treatment plan unless recalled by patient accurately.  I'm also looking for a provider who will work with me to find a product that keeps her stable mentally but doesn't albaro up her cholesterol like Abilify has done.

## 2024-08-13 ENCOUNTER — TELEPHONE (OUTPATIENT)
Dept: INTERNAL MEDICINE CLINIC | Facility: CLINIC | Age: 49
End: 2024-08-13

## 2024-08-13 DIAGNOSIS — E78.49: Primary | ICD-10-CM

## 2024-08-13 DIAGNOSIS — Z79.899 LONG TERM CURRENT USE OF ANTIPSYCHOTIC MEDICATION: ICD-10-CM

## 2024-08-13 NOTE — TELEPHONE ENCOUNTER
----- Message from Racquel Wynn PA-C sent at 8/13/2024  1:08 PM EDT -----  Please get on our schedule for sometime in October.  Looks like psychiatry has changed her meds in June so hopefully her cholesterol levels will be better off some of the other meds she was on.  She'll need fasting labs 3-7 days prior.    Thanks!  ----- Message -----  From: Violeta Rosenberg MA  Sent: 3/6/2024  11:50 AM EDT  To: TREMAINE Melton Karen will see the patient. Unfortunately she is booking into December. :(    Violeta  ----- Message -----  From: Racquel Wynn PA-C  Sent: 2/20/2024  10:19 PM EST  To: Violeta Rosenberg MA    So how would I go about finding out if Cate Lenny would accept this patient?  She was seeing Reyes Lee but she has left and patient needs a psych provider.  I'd really prefer to keep within the [facility] system since there is already a language barrier and it is so helpful to be able to read the notes and know what they did or what they have her on.  When seen outside of our system we rarely get records from psych so details are not known regarding treatment plan unless recalled by patient accurately.  I'm also looking for a provider who will work with me to find a product that keeps her stable mentally but doesn't albaro up her cholesterol like Tru has done.

## 2024-08-13 NOTE — TELEPHONE ENCOUNTER
Please contact pt to schedule an appt with Racquel for Oct with fasting labs 1 week prior. Thank you.

## 2024-09-16 ENCOUNTER — OFFICE VISIT (OUTPATIENT)
Dept: BEHAVIORAL/MENTAL HEALTH CLINIC | Age: 49
End: 2024-09-16
Payer: COMMERCIAL

## 2024-09-16 VITALS — SYSTOLIC BLOOD PRESSURE: 112 MMHG | OXYGEN SATURATION: 96 % | HEART RATE: 81 BPM | DIASTOLIC BLOOD PRESSURE: 72 MMHG

## 2024-09-16 DIAGNOSIS — F25.0 SCHIZOAFFECTIVE DISORDER, BIPOLAR TYPE (HCC): ICD-10-CM

## 2024-09-16 DIAGNOSIS — F43.10 PTSD (POST-TRAUMATIC STRESS DISORDER): ICD-10-CM

## 2024-09-16 DIAGNOSIS — F41.1 GENERALIZED ANXIETY DISORDER: Primary | ICD-10-CM

## 2024-09-16 PROCEDURE — 99215 OFFICE O/P EST HI 40 MIN: CPT | Performed by: NURSE PRACTITIONER

## 2024-09-16 ASSESSMENT — PATIENT HEALTH QUESTIONNAIRE - PHQ9
7. TROUBLE CONCENTRATING ON THINGS, SUCH AS READING THE NEWSPAPER OR WATCHING TELEVISION: NOT AT ALL
4. FEELING TIRED OR HAVING LITTLE ENERGY: NOT AT ALL
SUM OF ALL RESPONSES TO PHQ QUESTIONS 1-9: 0
5. POOR APPETITE OR OVEREATING: NOT AT ALL
SUM OF ALL RESPONSES TO PHQ9 QUESTIONS 1 & 2: 0
8. MOVING OR SPEAKING SO SLOWLY THAT OTHER PEOPLE COULD HAVE NOTICED. OR THE OPPOSITE, BEING SO FIGETY OR RESTLESS THAT YOU HAVE BEEN MOVING AROUND A LOT MORE THAN USUAL: NOT AT ALL
9. THOUGHTS THAT YOU WOULD BE BETTER OFF DEAD, OR OF HURTING YOURSELF: NOT AT ALL
3. TROUBLE FALLING OR STAYING ASLEEP: NOT AT ALL
10. IF YOU CHECKED OFF ANY PROBLEMS, HOW DIFFICULT HAVE THESE PROBLEMS MADE IT FOR YOU TO DO YOUR WORK, TAKE CARE OF THINGS AT HOME, OR GET ALONG WITH OTHER PEOPLE: NOT DIFFICULT AT ALL
2. FEELING DOWN, DEPRESSED OR HOPELESS: NOT AT ALL
SUM OF ALL RESPONSES TO PHQ QUESTIONS 1-9: 0
SUM OF ALL RESPONSES TO PHQ QUESTIONS 1-9: 0
1. LITTLE INTEREST OR PLEASURE IN DOING THINGS: NOT AT ALL
6. FEELING BAD ABOUT YOURSELF - OR THAT YOU ARE A FAILURE OR HAVE LET YOURSELF OR YOUR FAMILY DOWN: NOT AT ALL
SUM OF ALL RESPONSES TO PHQ QUESTIONS 1-9: 0

## 2024-10-23 ENCOUNTER — TRANSCRIBE ORDERS (OUTPATIENT)
Dept: SCHEDULING | Age: 49
End: 2024-10-23

## 2024-10-23 DIAGNOSIS — Z12.31 VISIT FOR SCREENING MAMMOGRAM: Primary | ICD-10-CM

## 2024-11-18 ENCOUNTER — OFFICE VISIT (OUTPATIENT)
Dept: INTERNAL MEDICINE CLINIC | Facility: CLINIC | Age: 49
End: 2024-11-18
Payer: COMMERCIAL

## 2024-11-18 ENCOUNTER — HOSPITAL ENCOUNTER (OUTPATIENT)
Dept: GENERAL RADIOLOGY | Age: 49
Discharge: HOME | End: 2024-11-21
Payer: COMMERCIAL

## 2024-11-18 VITALS
TEMPERATURE: 97.2 F | WEIGHT: 146.6 LBS | DIASTOLIC BLOOD PRESSURE: 88 MMHG | SYSTOLIC BLOOD PRESSURE: 128 MMHG | HEIGHT: 59 IN | HEART RATE: 90 BPM | BODY MASS INDEX: 29.56 KG/M2 | OXYGEN SATURATION: 97 %

## 2024-11-18 DIAGNOSIS — M25.562 ACUTE PAIN OF LEFT KNEE: Primary | ICD-10-CM

## 2024-11-18 DIAGNOSIS — G44.89 HEADACHE SYNDROME: ICD-10-CM

## 2024-11-18 DIAGNOSIS — M25.562 ACUTE PAIN OF LEFT KNEE: ICD-10-CM

## 2024-11-18 PROCEDURE — 73562 X-RAY EXAM OF KNEE 3: CPT

## 2024-11-18 PROCEDURE — 99214 OFFICE O/P EST MOD 30 MIN: CPT | Performed by: NURSE PRACTITIONER

## 2024-11-18 SDOH — ECONOMIC STABILITY: FOOD INSECURITY: WITHIN THE PAST 12 MONTHS, YOU WORRIED THAT YOUR FOOD WOULD RUN OUT BEFORE YOU GOT MONEY TO BUY MORE.: NEVER TRUE

## 2024-11-18 SDOH — ECONOMIC STABILITY: FOOD INSECURITY: WITHIN THE PAST 12 MONTHS, THE FOOD YOU BOUGHT JUST DIDN'T LAST AND YOU DIDN'T HAVE MONEY TO GET MORE.: NEVER TRUE

## 2024-11-18 SDOH — ECONOMIC STABILITY: INCOME INSECURITY: HOW HARD IS IT FOR YOU TO PAY FOR THE VERY BASICS LIKE FOOD, HOUSING, MEDICAL CARE, AND HEATING?: NOT HARD AT ALL

## 2024-11-18 ASSESSMENT — ENCOUNTER SYMPTOMS
SHORTNESS OF BREATH: 0
ABDOMINAL PAIN: 0
COUGH: 0
WHEEZING: 0
NAUSEA: 0
VOMITING: 0
SORE THROAT: 0

## 2024-11-18 NOTE — PROGRESS NOTES
left side.   Pulmonary:      Effort: Pulmonary effort is normal. No respiratory distress.      Breath sounds: Normal breath sounds. No wheezing or rales.   Abdominal:      General: Bowel sounds are normal.      Palpations: Abdomen is soft.      Tenderness: There is no abdominal tenderness.   Musculoskeletal:      Left knee: Crepitus present. Normal range of motion. No tenderness.      Right lower leg: No edema.      Left lower leg: No edema.   Skin:     General: Skin is warm and dry.   Neurological:      Mental Status: She is alert and oriented to person, place, and time.   Psychiatric:         Mood and Affect: Mood normal.           Assessment/Plan:    Marky was seen today for knee pain.    Diagnoses and all orders for this visit:    Acute pain of left knee  -     XR KNEE LEFT (3 VIEWS); Future  Knee x-ray ordered to further evaluate.     Headache syndrome  -     CT HEAD WO CONTRAST; Future  Head CT ordered to further evaluate. Ensure adequate hydration, good sleep hygiene, healthy diet, regular exercise and stress reduction.       Follow-up and Dispositions    Return for next scheduled routine follow-up or sooner if needed.         On this date, 11/18/24, I have spent 36 minutes reviewing previous notes, test results and face to face with the patient discussing the diagnosis and importance of compliance with the treatment plan as well as documenting on the day of the visit.     An electronic signature was used to authenticate this note.  -KELLY Cote

## 2024-12-01 ENCOUNTER — HOSPITAL ENCOUNTER (EMERGENCY)
Age: 49
Discharge: HOME | End: 2024-12-02
Payer: COMMERCIAL

## 2024-12-01 ENCOUNTER — APPOINTMENT (OUTPATIENT)
Dept: CT IMAGING | Age: 49
End: 2024-12-01
Payer: COMMERCIAL

## 2024-12-01 VITALS
HEART RATE: 81 BPM | BODY MASS INDEX: 29.43 KG/M2 | DIASTOLIC BLOOD PRESSURE: 91 MMHG | HEIGHT: 59 IN | TEMPERATURE: 98.2 F | RESPIRATION RATE: 18 BRPM | SYSTOLIC BLOOD PRESSURE: 136 MMHG | WEIGHT: 146 LBS | OXYGEN SATURATION: 98 %

## 2024-12-01 DIAGNOSIS — M54.2 NECK PAIN: ICD-10-CM
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
## 2024-12-01 PROCEDURE — 99284 EMERGENCY DEPT VISIT MOD MDM: CPT

## 2024-12-01 PROCEDURE — 96372 THER/PROPH/DIAG INJ SC/IM: CPT

## 2024-12-01 PROCEDURE — 70450 CT HEAD/BRAIN W/O DYE: CPT

## 2024-12-01 PROCEDURE — 6360000002 HC RX W HCPCS

## 2024-12-01 PROCEDURE — 6370000000 HC RX 637 (ALT 250 FOR IP)

## 2024-12-01 PROCEDURE — 72125 CT NECK SPINE W/O DYE: CPT

## 2024-12-01 RX ORDER — DEXAMETHASONE SODIUM PHOSPHATE 10 MG/ML
10 INJECTION INTRAMUSCULAR; INTRAVENOUS ONCE
Status: COMPLETED | OUTPATIENT
Start: 2024-12-01 | End: 2024-12-01

## 2024-12-01 RX ORDER — CYCLOBENZAPRINE HCL 10 MG
10 TABLET ORAL
Status: COMPLETED | OUTPATIENT
Start: 2024-12-01 | End: 2024-12-01

## 2024-12-01 RX ORDER — KETOROLAC TROMETHAMINE 30 MG/ML
30 INJECTION, SOLUTION INTRAMUSCULAR; INTRAVENOUS ONCE
Status: COMPLETED | OUTPATIENT
Start: 2024-12-01 | End: 2024-12-01

## 2024-12-01 RX ADMIN — DEXAMETHASONE SODIUM PHOSPHATE 10 MG: 10 INJECTION INTRAMUSCULAR; INTRAVENOUS at 23:09

## 2024-12-01 RX ADMIN — CYCLOBENZAPRINE 10 MG: 10 TABLET, FILM COATED ORAL at 23:09

## 2024-12-01 RX ADMIN — KETOROLAC TROMETHAMINE 30 MG: 30 INJECTION, SOLUTION INTRAMUSCULAR at 23:13

## 2024-12-01 ASSESSMENT — LIFESTYLE VARIABLES
HOW OFTEN DO YOU HAVE A DRINK CONTAINING ALCOHOL: NEVER
HOW MANY STANDARD DRINKS CONTAINING ALCOHOL DO YOU HAVE ON A TYPICAL DAY: PATIENT DOES NOT DRINK

## 2024-12-01 ASSESSMENT — PAIN - FUNCTIONAL ASSESSMENT: PAIN_FUNCTIONAL_ASSESSMENT: 0-10

## 2024-12-01 ASSESSMENT — PAIN DESCRIPTION - LOCATION: LOCATION: NECK;SHOULDER

## 2024-12-01 ASSESSMENT — PAIN SCALES - GENERAL: PAINLEVEL_OUTOF10: 8

## 2024-12-02 RX ORDER — CYCLOBENZAPRINE HCL 10 MG
10 TABLET ORAL NIGHTLY PRN
Qty: 10 TABLET | Refills: 0 | Status: SHIPPED | OUTPATIENT
Start: 2024-12-02 | End: 2024-12-12

## 2024-12-02 RX ORDER — MELOXICAM 15 MG/1
15 TABLET ORAL DAILY
Qty: 30 TABLET | Refills: 0 | Status: SHIPPED | OUTPATIENT
Start: 2024-12-02 | End: 2025-01-01

## 2024-12-02 NOTE — ED TRIAGE NOTES
Pt presents ambulatory to triage stating that she fell last night and hit her head.  She denies LOC.    Patient complains of pain to neck and shoulders. She has been taking Advil with little to no relief.  She states she cannot move her head to the left or the right because of pain.      Pt rates pain at 8/10.

## 2024-12-02 NOTE — DISCHARGE INSTRUCTIONS
Please take the medications as prescribed.  The Mobic is for pain and inflammation and the Flexeril is for muscle relaxer.  Use a heating pad on your neck and try and perform gentle range of motion.  Follow-up with your primary care provider to ensure improvement in your symptoms.    Return to the ED immediately with any new or worsening symptoms.

## 2024-12-02 NOTE — ED PROVIDER NOTES
Emergency Department Provider Note       PCP: Racquel Wynn PA-C   Age: 49 y.o.   Sex: female     DISPOSITION Decision To Discharge 12/02/2024 12:13:55 AM    ICD-10-CM    1. Fall, initial encounter  W19.XXXA       2. Neck pain  M54.2           Medical Decision Making     Patient is a 49-year-old female with past medical history of anxiety presenting with neck pain after a fall.  Patient states last night she was praying with her  when she tripped and fell backwards.  She did hit her head but she did not lose consciousness.  She states she is mainly feeling pain in her neck.      On presentation, patient is afebrile, vital signs are stable, and she is well-appearing in no acute distress.  She is ambulatory into our facility with a normal gait.  She has some tenderness to palpation to bilateral cervical paraspinous muscles and in bilateral trapezius regions.  She does have full range of motion in her neck but discomfort with moving it to the left and right.  She has no nuchal rigidity or meningeal signs.  No palpable crepitus or step-offs.  No Lerma sign, raccoon eyes, mastoid tenderness, hemotympanum, or any other signs of basilar skull fracture.  She is neurologically intact.    Will obtain CT scans of both her head and neck for further evaluation.  Will provide her with Decadron, Toradol, and Flexeril.    CT scans are negative for any acute fracture, intracranial bleed, or other acute abnormality.  On reevaluation of the patient she does feel like she can move her neck more without pain and then when she came into the facility.    Due to stable vital signs, nontoxic appearance, no evidence of central cord syndrome or cord compression, no acute intracranial bleed or any other emergent process plan for this patient as continued outpatient management.  Will send her home with Flexeril and Mobic.  Advise she follow-up with her primary care provider next week to ensure improvement in her symptoms.

## 2024-12-16 ENCOUNTER — OFFICE VISIT (OUTPATIENT)
Dept: BEHAVIORAL/MENTAL HEALTH CLINIC | Age: 49
End: 2024-12-16
Payer: MEDICAID

## 2024-12-16 ENCOUNTER — HOSPITAL ENCOUNTER (OUTPATIENT)
Dept: MAMMOGRAPHY | Age: 49
Discharge: HOME | End: 2024-12-19
Payer: COMMERCIAL

## 2024-12-16 VITALS
HEART RATE: 85 BPM | DIASTOLIC BLOOD PRESSURE: 84 MMHG | SYSTOLIC BLOOD PRESSURE: 120 MMHG | OXYGEN SATURATION: 96 % | WEIGHT: 144.8 LBS | BODY MASS INDEX: 29.25 KG/M2

## 2024-12-16 DIAGNOSIS — Z79.899 LONG TERM CURRENT USE OF ANTIPSYCHOTIC MEDICATION: ICD-10-CM

## 2024-12-16 DIAGNOSIS — F43.10 PTSD (POST-TRAUMATIC STRESS DISORDER): ICD-10-CM

## 2024-12-16 DIAGNOSIS — F51.04 PSYCHOPHYSIOLOGICAL INSOMNIA: ICD-10-CM

## 2024-12-16 DIAGNOSIS — Z12.31 VISIT FOR SCREENING MAMMOGRAM: ICD-10-CM

## 2024-12-16 DIAGNOSIS — F25.0 SCHIZOAFFECTIVE DISORDER, BIPOLAR TYPE (HCC): ICD-10-CM

## 2024-12-16 DIAGNOSIS — F41.1 GENERALIZED ANXIETY DISORDER: Primary | ICD-10-CM

## 2024-12-16 DIAGNOSIS — E78.49: Primary | ICD-10-CM

## 2024-12-16 PROCEDURE — 77063 BREAST TOMOSYNTHESIS BI: CPT

## 2024-12-16 PROCEDURE — 99215 OFFICE O/P EST HI 40 MIN: CPT | Performed by: NURSE PRACTITIONER

## 2024-12-16 RX ORDER — CYCLOBENZAPRINE HCL 10 MG
10 TABLET ORAL 3 TIMES DAILY PRN
COMMUNITY
Start: 2024-10-15

## 2024-12-16 RX ORDER — TRAZODONE HYDROCHLORIDE 100 MG/1
100 TABLET ORAL NIGHTLY PRN
Qty: 30 TABLET | Refills: 3 | Status: SHIPPED | OUTPATIENT
Start: 2024-12-16

## 2024-12-16 ASSESSMENT — PATIENT HEALTH QUESTIONNAIRE - PHQ9
7. TROUBLE CONCENTRATING ON THINGS, SUCH AS READING THE NEWSPAPER OR WATCHING TELEVISION: NOT AT ALL
5. POOR APPETITE OR OVEREATING: NOT AT ALL
4. FEELING TIRED OR HAVING LITTLE ENERGY: NOT AT ALL
6. FEELING BAD ABOUT YOURSELF - OR THAT YOU ARE A FAILURE OR HAVE LET YOURSELF OR YOUR FAMILY DOWN: NOT AT ALL
SUM OF ALL RESPONSES TO PHQ QUESTIONS 1-9: 0
SUM OF ALL RESPONSES TO PHQ9 QUESTIONS 1 & 2: 0
8. MOVING OR SPEAKING SO SLOWLY THAT OTHER PEOPLE COULD HAVE NOTICED. OR THE OPPOSITE, BEING SO FIGETY OR RESTLESS THAT YOU HAVE BEEN MOVING AROUND A LOT MORE THAN USUAL: NOT AT ALL
2. FEELING DOWN, DEPRESSED OR HOPELESS: NOT AT ALL
3. TROUBLE FALLING OR STAYING ASLEEP: NOT AT ALL
9. THOUGHTS THAT YOU WOULD BE BETTER OFF DEAD, OR OF HURTING YOURSELF: NOT AT ALL
1. LITTLE INTEREST OR PLEASURE IN DOING THINGS: NOT AT ALL

## 2024-12-16 NOTE — PROGRESS NOTES
OUTPATIENT PSYCHIATRIC RETURN VISIT PROGRESS NOTE      --Cate Galvez, APRN - CNP on 12/16/2024 at 10:50 AM    An electronic signature was used to authenticate this note.   Date of Service: 12/16/2024     Identification    Marky Garrison is a 49 y.o.  with a past psychiatric history of schizoaffective d/o, bipolar type, RUBI, PTSD  who presents today for a psychiatric follow up appointment.      CC:  Routine medication management follow up.    Chief Complaint   Patient presents with    Follow-up     3 month f/u, pt reports 2 weeks ago pt had encounter with someone where she got angry, then went to a  for [employer] and then fell so went to ED. Pt worried about getting angry        Subjective / Interval History:    Pt comes to clinic today  with the use of translation services ([place]ian)  and reports that mood is stabilizing on current medication regimen and patient is tolerating current medications with no adverse effects.Says she  has been having difficulty sleeping with night time awakenings. Agrees to Trazodone 100 mg HS prn . Vraylar remains effective for mood stabilization. Denies symptoms of juan david, hypomania or depression.     . Pt has been medication compliant and denies any side-effects. Pt denies SI, HI and AVH. Does not endorse any manic or psychotic symptoms.    Psychiatric Review Of Systems:  Sleep: nightime awakenings and difficulty falling back asleep if awakened  Appetite: ok  Current suicidal/homicidal ideations: Denies SI/ HI  Current auditory/visual hallucinations: Denies     Medications:    Current Outpatient Medications:     cyclobenzaprine (FLEXERIL) 10 MG tablet, Take 1 tablet by mouth 3 times daily as needed, Disp: , Rfl:     meloxicam (MOBIC) 15 MG tablet, Take 1 tablet by mouth daily, Disp: 30 tablet, Rfl: 0    cariprazine hcl (VRAYLAR) 1.5 MG capsule, Take 1 capsule by mouth nightly, Disp: 30 capsule, Rfl: 3    ibuprofen (ADVIL;MOTRIN) 600 MG tablet, Take 1 tablet by mouth 3

## 2024-12-30 ENCOUNTER — LAB (OUTPATIENT)
Dept: INTERNAL MEDICINE CLINIC | Facility: CLINIC | Age: 49
End: 2024-12-30

## 2024-12-30 DIAGNOSIS — E78.49: ICD-10-CM

## 2024-12-30 DIAGNOSIS — Z79.899 LONG TERM CURRENT USE OF ANTIPSYCHOTIC MEDICATION: ICD-10-CM

## 2024-12-30 LAB
ALBUMIN SERPL-MCNC: 3.8 G/DL (ref 3.5–5)
ALBUMIN/GLOB SERPL: 1.1 (ref 1–1.9)
ALP SERPL-CCNC: 78 U/L (ref 35–104)
ALT SERPL-CCNC: 27 U/L (ref 8–45)
ANION GAP SERPL CALC-SCNC: 12 MMOL/L (ref 7–16)
AST SERPL-CCNC: 30 U/L (ref 15–37)
BILIRUB SERPL-MCNC: 0.4 MG/DL (ref 0–1.2)
BUN SERPL-MCNC: 11 MG/DL (ref 6–23)
CALCIUM SERPL-MCNC: 9.1 MG/DL (ref 8.8–10.2)
CHLORIDE SERPL-SCNC: 104 MMOL/L (ref 98–107)
CHOLEST SERPL-MCNC: 200 MG/DL (ref 0–200)
CO2 SERPL-SCNC: 24 MMOL/L (ref 20–29)
CREAT SERPL-MCNC: 0.59 MG/DL (ref 0.6–1.1)
GLOBULIN SER CALC-MCNC: 3.5 G/DL (ref 2.3–3.5)
GLUCOSE SERPL-MCNC: 100 MG/DL (ref 70–99)
HDLC SERPL-MCNC: 46 MG/DL (ref 40–60)
HDLC SERPL: 4.4 (ref 0–5)
LDLC SERPL CALC-MCNC: 133 MG/DL (ref 0–100)
POTASSIUM SERPL-SCNC: 3.8 MMOL/L (ref 3.5–5.1)
PROT SERPL-MCNC: 7.4 G/DL (ref 6.3–8.2)
SODIUM SERPL-SCNC: 140 MMOL/L (ref 136–145)
TRIGL SERPL-MCNC: 108 MG/DL (ref 0–150)
VLDLC SERPL CALC-MCNC: 22 MG/DL (ref 6–23)

## 2025-01-06 ENCOUNTER — LAB (OUTPATIENT)
Dept: INTERNAL MEDICINE CLINIC | Facility: CLINIC | Age: 50
End: 2025-01-06

## 2025-01-06 ENCOUNTER — OFFICE VISIT (OUTPATIENT)
Dept: INTERNAL MEDICINE CLINIC | Facility: CLINIC | Age: 50
End: 2025-01-06
Payer: COMMERCIAL

## 2025-01-06 VITALS
DIASTOLIC BLOOD PRESSURE: 82 MMHG | RESPIRATION RATE: 16 BRPM | SYSTOLIC BLOOD PRESSURE: 120 MMHG | HEIGHT: 59 IN | HEART RATE: 84 BPM | OXYGEN SATURATION: 97 % | TEMPERATURE: 97.1 F | BODY MASS INDEX: 28.87 KG/M2 | WEIGHT: 143.2 LBS

## 2025-01-06 DIAGNOSIS — R73.01 ELEVATED FASTING GLUCOSE: ICD-10-CM

## 2025-01-06 DIAGNOSIS — F25.0 SCHIZOAFFECTIVE DISORDER, BIPOLAR TYPE (HCC): ICD-10-CM

## 2025-01-06 DIAGNOSIS — E78.49: ICD-10-CM

## 2025-01-06 DIAGNOSIS — Z00.00 ROUTINE PHYSICAL EXAMINATION: Primary | ICD-10-CM

## 2025-01-06 DIAGNOSIS — Z00.00 ROUTINE PHYSICAL EXAMINATION: ICD-10-CM

## 2025-01-06 DIAGNOSIS — Z12.31 SCREENING MAMMOGRAM FOR BREAST CANCER: ICD-10-CM

## 2025-01-06 DIAGNOSIS — L30.1 DYSHIDROTIC HAND DERMATITIS: ICD-10-CM

## 2025-01-06 DIAGNOSIS — J20.9 ACUTE BRONCHITIS, UNSPECIFIED ORGANISM: ICD-10-CM

## 2025-01-06 LAB
APPEARANCE UR: CLEAR
BASOPHILS # BLD: 0 K/UL (ref 0–0.2)
BASOPHILS NFR BLD: 0 % (ref 0–2)
BILIRUB UR QL: NEGATIVE
COLOR UR: NORMAL
DIFFERENTIAL METHOD BLD: NORMAL
EOSINOPHIL # BLD: 0.2 K/UL (ref 0–0.8)
EOSINOPHIL NFR BLD: 4 % (ref 0.5–7.8)
ERYTHROCYTE [DISTWIDTH] IN BLOOD BY AUTOMATED COUNT: 13.2 % (ref 11.9–14.6)
GLUCOSE UR STRIP.AUTO-MCNC: NEGATIVE MG/DL
HCT VFR BLD AUTO: 41.6 % (ref 35.8–46.3)
HGB BLD-MCNC: 13.7 G/DL (ref 11.7–15.4)
HGB UR QL STRIP: NEGATIVE
IMM GRANULOCYTES # BLD AUTO: 0 K/UL (ref 0–0.5)
IMM GRANULOCYTES NFR BLD AUTO: 1 % (ref 0–5)
KETONES UR QL STRIP.AUTO: NEGATIVE MG/DL
LEUKOCYTE ESTERASE UR QL STRIP.AUTO: NEGATIVE
LYMPHOCYTES # BLD: 2.5 K/UL (ref 0.5–4.6)
LYMPHOCYTES NFR BLD: 42 % (ref 13–44)
MCH RBC QN AUTO: 28.8 PG (ref 26.1–32.9)
MCHC RBC AUTO-ENTMCNC: 32.9 G/DL (ref 31.4–35)
MCV RBC AUTO: 87.4 FL (ref 82–102)
MONOCYTES # BLD: 0.3 K/UL (ref 0.1–1.3)
MONOCYTES NFR BLD: 5 % (ref 4–12)
NEUTS SEG # BLD: 2.8 K/UL (ref 1.7–8.2)
NEUTS SEG NFR BLD: 48 % (ref 43–78)
NITRITE UR QL STRIP.AUTO: NEGATIVE
NRBC # BLD: 0 K/UL (ref 0–0.2)
PH UR STRIP: 7 (ref 5–9)
PLATELET # BLD AUTO: 286 K/UL (ref 150–450)
PMV BLD AUTO: 9.5 FL (ref 9.4–12.3)
PROT UR STRIP-MCNC: NEGATIVE MG/DL
RBC # BLD AUTO: 4.76 M/UL (ref 4.05–5.2)
SP GR UR REFRACTOMETRY: 1.01 (ref 1–1.02)
TSH, 3RD GENERATION: 0.96 UIU/ML (ref 0.27–4.2)
UROBILINOGEN UR QL STRIP.AUTO: 0.2 EU/DL (ref 0.2–1)
WBC # BLD AUTO: 5.9 K/UL (ref 4.3–11.1)

## 2025-01-06 PROCEDURE — 99214 OFFICE O/P EST MOD 30 MIN: CPT | Performed by: PHYSICIAN ASSISTANT

## 2025-01-06 PROCEDURE — 99396 PREV VISIT EST AGE 40-64: CPT | Performed by: PHYSICIAN ASSISTANT

## 2025-01-06 RX ORDER — AZITHROMYCIN 250 MG/1
TABLET, FILM COATED ORAL
Qty: 6 TABLET | Refills: 0 | Status: SHIPPED | OUTPATIENT
Start: 2025-01-06 | End: 2025-01-16

## 2025-01-06 RX ORDER — HYDROCODONE BITARTRATE AND HOMATROPINE METHYLBROMIDE ORAL SOLUTION 5; 1.5 MG/5ML; MG/5ML
LIQUID ORAL
COMMUNITY
Start: 2025-01-03

## 2025-01-06 RX ORDER — ALBUTEROL SULFATE 90 UG/1
2 INHALANT RESPIRATORY (INHALATION) 4 TIMES DAILY PRN
Qty: 18 G | Refills: 0 | Status: SHIPPED | OUTPATIENT
Start: 2025-01-06

## 2025-01-06 ASSESSMENT — ENCOUNTER SYMPTOMS
SHORTNESS OF BREATH: 1
ABDOMINAL PAIN: 0
RHINORRHEA: 0
BACK PAIN: 0
VOICE CHANGE: 0
NAUSEA: 1
ROS SKIN COMMENTS: POSITIVE FOR ITCHING
EYE PAIN: 0
COUGH: 0
EYE DISCHARGE: 0
VOMITING: 0
EYE ITCHING: 0
EYE REDNESS: 0
WHEEZING: 0
PHOTOPHOBIA: 0
SORE THROAT: 1
DIARRHEA: 0
CONSTIPATION: 0
BLOOD IN STOOL: 0

## 2025-01-06 ASSESSMENT — PATIENT HEALTH QUESTIONNAIRE - PHQ9
2. FEELING DOWN, DEPRESSED OR HOPELESS: NOT AT ALL
SUM OF ALL RESPONSES TO PHQ9 QUESTIONS 1 & 2: 0
SUM OF ALL RESPONSES TO PHQ QUESTIONS 1-9: 0
1. LITTLE INTEREST OR PLEASURE IN DOING THINGS: NOT AT ALL
SUM OF ALL RESPONSES TO PHQ QUESTIONS 1-9: 0

## 2025-01-06 NOTE — PATIENT INSTRUCTIONS
Start Z-Pack and take as directed  Remain on Mucinex 12 Hour twice daily until cough resolves  Use albuterol inhaler 2 puffs 4 times/day x 1-2 weeks then taper down as cough improves  Advised to contact us if cough lingers longer than 1 more week because we would want to get a chest x-ray done  Plenty of rest and fluids  Recommend Eucerin Hand Cream applied after each hand washing  Reassured that cholesterol levels look MUCH better since getting off Abilify  Maintain a diet that is low in sugar with limited carbohydrates due to borderline glucose levels  Reminded to stay well hydrated with plenty of water  Reviewed that next Cologuard & pap smear will be due in 2026  Encouraged monthly self breast exams  Continue chronic medications as prescribed  Continue a regular exercise routine for best cardiovascular health  Agreed that she could remain on MVI, B12 supplement, & vitamin d3 supplement daily

## 2025-01-06 NOTE — PROGRESS NOTES
vomiting.        Negative for heartburn   Endocrine: Positive for polydipsia. Negative for cold intolerance and heat intolerance.        Positive for hair loss   Genitourinary:  Positive for enuresis (leakage with coughing) and frequency. Negative for dysuria, flank pain, hematuria and urgency.   Musculoskeletal:  Positive for arthralgias (L knee) and neck pain. Negative for back pain and myalgias.   Skin:  Positive for rash (bilateral hands).        Positive for itching   Allergic/Immunologic: Positive for environmental allergies.   Neurological:  Negative for dizziness, tremors, weakness, numbness and headaches.   Hematological:  Does not bruise/bleed easily.   Psychiatric/Behavioral:  Positive for dysphoric mood and sleep disturbance. The patient is nervous/anxious.           /82   Pulse 84   Temp 97.1 °F (36.2 °C) (Temporal)   Resp 16   Ht 1.499 m (4' 11\")   Wt 65 kg (143 lb 3.2 oz)   LMP 03/04/2024 (Exact Date)   SpO2 97%   BMI 28.92 kg/m²         Objective   Physical Exam  Constitutional:       Appearance: Normal appearance.   HENT:      Head: Normocephalic and atraumatic.      Right Ear: Tympanic membrane, ear canal and external ear normal.      Left Ear: Tympanic membrane and external ear normal.      Nose: Congestion (with erythematous turbinates) present.      Right Turbinates: Not swollen.      Left Turbinates: Not swollen.      Right Sinus: No maxillary sinus tenderness or frontal sinus tenderness.      Left Sinus: No maxillary sinus tenderness or frontal sinus tenderness.      Mouth/Throat:      Mouth: Mucous membranes are moist.      Pharynx: Oropharynx is clear.   Eyes:      Extraocular Movements: Extraocular movements intact.      Conjunctiva/sclera: Conjunctivae normal.      Pupils: Pupils are equal, round, and reactive to light.   Neck:      Thyroid: No thyromegaly.      Vascular: No carotid bruit.   Cardiovascular:      Rate and Rhythm: Normal rate and regular rhythm.      Pulses:

## 2025-01-07 NOTE — RESULT ENCOUNTER NOTE
Thyroid function was normal.  Blood counts all look good (white blood cells, hemoglobin, & platelets).  Urine is normal also.

## 2025-01-18 NOTE — PATIENT INSTRUCTIONS
Reviewed potential side effects of Vraylar which does include body temperature dysregulation and increased body temperature but given that she reports feeling better emotionally I would not advise changing meds at this point but she can discuss with her mental health NP next week Telemetry Bed?: Yes   Admitting Physician: KT CRAWLEY [[id number]]   Is this a telephone or verbal order?: This is a telephone order from the admitting physician

## 2025-02-19 RX ORDER — CARIPRAZINE 1.5 MG/1
CAPSULE, GELATIN COATED ORAL
Qty: 30 CAPSULE | Refills: 3 | OUTPATIENT
Start: 2025-02-19

## 2025-02-27 PROBLEM — M77.10 LATERAL EPICONDYLITIS: Status: ACTIVE | Noted: 2022-01-21

## 2025-02-27 PROBLEM — K21.9 GASTROESOPHAGEAL REFLUX DISEASE: Status: ACTIVE | Noted: 2025-02-27

## 2025-02-27 NOTE — PROGRESS NOTES
Marky Garrison (:  1975) is a 49 y.o. female,Established patient, here for evaluation of the following chief complaint(s):  Headache (Pt c/o headaches and neck pain at night causing her to be unable to get adequate sleep since December. She stopped taking trazodone because it did not help. ) and Animal Bite (Pt states that her dog bit her on the left side of her face in January and she now has a timoteo on the inside of her cheek. She is concerned that it may be a worm. )          Assessment/Plan  1. Cervicalgia of mxanobnu-qgdfwss-ciajt region  -     XR CERVICAL SPINE (2-3 VIEWS); Future  2. Linea alba of oral mucosa           Patient Instructions   Reassurance given regarding linea alba along inside of L cheek - most likely caused from friction from teeth/dentures  Continue chronic medications as prescribed  Discussed likely musculoskeletal source of cervical pain and conservative methods to alleviate        Return if symptoms worsen or fail to improve.      Subjective   Neck Pain   This is a chronic problem. Episode onset: 1-3 years ago. The problem occurs constantly. Progression since onset: worsened after fall. The pain is associated with nothing. The pain is present in the occipital region. The quality of the pain is described as aching (dull). The pain is at a severity of 5/10. The symptoms are aggravated by position (lying down/being still). Associated symptoms include headaches (occipital), numbness (bilateral upper extremities), photophobia, tingling (bilateral upper extremities) and a visual change. Treatments tried: movement/changing positions. The treatment provided moderate relief.   She describes mild intermittent pain in the same area (a few times/week) before fall in December but has since increased to daily dull achy pain (5/10 in severity) in mid-line neck with radiation into posterior central area of head.      From [facility] @ Emergency Room on 24:  Patient is a 49-year-old female with

## 2025-03-03 ENCOUNTER — OFFICE VISIT (OUTPATIENT)
Dept: INTERNAL MEDICINE CLINIC | Facility: CLINIC | Age: 50
End: 2025-03-03
Payer: COMMERCIAL

## 2025-03-03 ENCOUNTER — HOSPITAL ENCOUNTER (OUTPATIENT)
Dept: GENERAL RADIOLOGY | Age: 50
Discharge: HOME | End: 2025-03-06
Payer: COMMERCIAL

## 2025-03-03 VITALS
DIASTOLIC BLOOD PRESSURE: 90 MMHG | SYSTOLIC BLOOD PRESSURE: 118 MMHG | HEIGHT: 59 IN | OXYGEN SATURATION: 98 % | BODY MASS INDEX: 28.43 KG/M2 | HEART RATE: 82 BPM | WEIGHT: 141 LBS | TEMPERATURE: 97.4 F

## 2025-03-03 DIAGNOSIS — M54.2 CERVICALGIA OF OCCIPITO-ATLANTO-AXIAL REGION: Primary | ICD-10-CM

## 2025-03-03 DIAGNOSIS — M54.2 CERVICALGIA OF OCCIPITO-ATLANTO-AXIAL REGION: ICD-10-CM

## 2025-03-03 DIAGNOSIS — K13.79: ICD-10-CM

## 2025-03-03 PROCEDURE — 72040 X-RAY EXAM NECK SPINE 2-3 VW: CPT

## 2025-03-03 PROCEDURE — 99214 OFFICE O/P EST MOD 30 MIN: CPT | Performed by: PHYSICIAN ASSISTANT

## 2025-03-03 RX ORDER — M-VIT,TX,IRON,MINS/CALC/FOLIC 27MG-0.4MG
1 TABLET ORAL DAILY
COMMUNITY

## 2025-03-03 SDOH — ECONOMIC STABILITY: FOOD INSECURITY: WITHIN THE PAST 12 MONTHS, YOU WORRIED THAT YOUR FOOD WOULD RUN OUT BEFORE YOU GOT MONEY TO BUY MORE.: NEVER TRUE

## 2025-03-03 SDOH — ECONOMIC STABILITY: FOOD INSECURITY: WITHIN THE PAST 12 MONTHS, THE FOOD YOU BOUGHT JUST DIDN'T LAST AND YOU DIDN'T HAVE MONEY TO GET MORE.: NEVER TRUE

## 2025-03-03 ASSESSMENT — ENCOUNTER SYMPTOMS
VISUAL CHANGE: 1
PHOTOPHOBIA: 1

## 2025-03-06 NOTE — PATIENT INSTRUCTIONS
Reassurance given regarding linea alba along inside of L cheek - most likely caused from friction from teeth/dentures  Continue chronic medications as prescribed  Discussed likely musculoskeletal source of cervical pain and conservative methods to alleviate

## 2025-03-06 NOTE — RESULT ENCOUNTER NOTE
Cervical spine is normal.  The pains she's experiencing may very well be muscular/tension type reaction to stress - I suggest taking OTC Ibuprofen 200-400 mg (1-2 x OTC pill) 2-3 times/day with food as needed but no longer than 2 weeks of consistent use.  Can also try ice therapy x 20 minutes when it bothers her.

## 2025-03-10 ENCOUNTER — RESULTS FOLLOW-UP (OUTPATIENT)
Dept: GENERAL RADIOLOGY | Age: 50
End: 2025-03-10

## 2025-03-17 ENCOUNTER — OFFICE VISIT (OUTPATIENT)
Dept: BEHAVIORAL/MENTAL HEALTH CLINIC | Age: 50
End: 2025-03-17
Payer: COMMERCIAL

## 2025-03-17 VITALS
DIASTOLIC BLOOD PRESSURE: 78 MMHG | SYSTOLIC BLOOD PRESSURE: 114 MMHG | BODY MASS INDEX: 29.29 KG/M2 | WEIGHT: 145 LBS | HEART RATE: 87 BPM | OXYGEN SATURATION: 97 %

## 2025-03-17 DIAGNOSIS — F41.1 GENERALIZED ANXIETY DISORDER: ICD-10-CM

## 2025-03-17 DIAGNOSIS — F25.0 SCHIZOAFFECTIVE DISORDER, BIPOLAR TYPE (HCC): Primary | ICD-10-CM

## 2025-03-17 DIAGNOSIS — F43.10 PTSD (POST-TRAUMATIC STRESS DISORDER): ICD-10-CM

## 2025-03-17 PROCEDURE — 99215 OFFICE O/P EST HI 40 MIN: CPT | Performed by: NURSE PRACTITIONER

## 2025-03-17 ASSESSMENT — PATIENT HEALTH QUESTIONNAIRE - PHQ9
5. POOR APPETITE OR OVEREATING: NOT AT ALL
SUM OF ALL RESPONSES TO PHQ QUESTIONS 1-9: 7
SUM OF ALL RESPONSES TO PHQ QUESTIONS 1-9: 7
8. MOVING OR SPEAKING SO SLOWLY THAT OTHER PEOPLE COULD HAVE NOTICED. OR THE OPPOSITE, BEING SO FIGETY OR RESTLESS THAT YOU HAVE BEEN MOVING AROUND A LOT MORE THAN USUAL: NOT AT ALL
3. TROUBLE FALLING OR STAYING ASLEEP: MORE THAN HALF THE DAYS
4. FEELING TIRED OR HAVING LITTLE ENERGY: SEVERAL DAYS
10. IF YOU CHECKED OFF ANY PROBLEMS, HOW DIFFICULT HAVE THESE PROBLEMS MADE IT FOR YOU TO DO YOUR WORK, TAKE CARE OF THINGS AT HOME, OR GET ALONG WITH OTHER PEOPLE: SOMEWHAT DIFFICULT
6. FEELING BAD ABOUT YOURSELF - OR THAT YOU ARE A FAILURE OR HAVE LET YOURSELF OR YOUR FAMILY DOWN: NOT AT ALL
SUM OF ALL RESPONSES TO PHQ QUESTIONS 1-9: 7
9. THOUGHTS THAT YOU WOULD BE BETTER OFF DEAD, OR OF HURTING YOURSELF: NOT AT ALL
1. LITTLE INTEREST OR PLEASURE IN DOING THINGS: NEARLY EVERY DAY
2. FEELING DOWN, DEPRESSED OR HOPELESS: SEVERAL DAYS
SUM OF ALL RESPONSES TO PHQ QUESTIONS 1-9: 7
7. TROUBLE CONCENTRATING ON THINGS, SUCH AS READING THE NEWSPAPER OR WATCHING TELEVISION: NOT AT ALL

## 2025-03-17 NOTE — PROGRESS NOTES
OUTPATIENT PSYCHIATRIC RETURN VISIT PROGRESS NOTE      --Cate Galvez, APRN - CNP on 3/17/2025 at 10:30 AM    An electronic signature was used to authenticate this note.   Date of Service: 3/17/2025     Identification    Marky Garrison is a 49 y.o.  with a past psychiatric history of schizoaffective d/o, bipolar type, anxiety, PTSD  who presents today for a psychiatric follow up appointment.   services are utilized as she speaks French only.     CC:  Routine medication management follow up.    Chief Complaint   Patient presents with    Follow-up     3 month f/u         Subjective / Interval History:    Pt comes to clinic today and reports that mood is stabilizing on current medication regimen and patient is tolerating current medications .  She is no longer taking the Trazodone and sleeps OK except for if a headache wakes her up. She has already reached out to PCP re: headaches and has had normal CT scans but says no has been recommended treatment .  I advised her that if headaches have worsened/ not resolved and are of enough severity to cause nighttime awakenings, she should see her PCP for treatment or referral.   She is tearful today stating that if she watches the news or on social media she becomes upset and fearful about  \" all the bad things that are happening ', referring to the actions of the current administration.  She mentions this as a primary source of worry and fear.    Pt denies SI, HI and AVH. Does not endorse any manic or psychotic symptoms.    Psychiatric Review Of Systems:  Sleep: nightime awakenings due to head pain  Appetite: ok  Current suicidal/homicidal ideations: Denies SI/ HI  Current auditory/visual hallucinations: Denies     Medications:    Current Outpatient Medications:     Multiple Vitamins-Minerals (THERAPEUTIC MULTIVITAMIN-MINERALS) tablet, Take 1 tablet by mouth daily, Disp: , Rfl:     HYDROcodone homatropine (HYCODAN) 5-1.5 MG/5ML solution, , Disp: , Rfl:

## 2025-04-16 ENCOUNTER — OFFICE VISIT (OUTPATIENT)
Dept: FAMILY MEDICINE CLINIC | Facility: CLINIC | Age: 50
End: 2025-04-16
Payer: COMMERCIAL

## 2025-04-16 VITALS
SYSTOLIC BLOOD PRESSURE: 120 MMHG | BODY MASS INDEX: 29.23 KG/M2 | DIASTOLIC BLOOD PRESSURE: 88 MMHG | TEMPERATURE: 98 F | HEIGHT: 59 IN | RESPIRATION RATE: 18 BRPM | HEART RATE: 80 BPM | WEIGHT: 145 LBS | OXYGEN SATURATION: 99 %

## 2025-04-16 DIAGNOSIS — F43.10 PTSD (POST-TRAUMATIC STRESS DISORDER): ICD-10-CM

## 2025-04-16 DIAGNOSIS — F41.1 GENERALIZED ANXIETY DISORDER: ICD-10-CM

## 2025-04-16 DIAGNOSIS — R10.30 LOWER ABDOMINAL PAIN: ICD-10-CM

## 2025-04-16 DIAGNOSIS — F25.0 SCHIZOAFFECTIVE DISORDER, BIPOLAR TYPE (HCC): Primary | ICD-10-CM

## 2025-04-16 DIAGNOSIS — E04.1 UNINODULAR GOITER (NONTOXIC): ICD-10-CM

## 2025-04-16 LAB
ALBUMIN SERPL-MCNC: 4.3 G/DL (ref 3.5–5)
ALBUMIN/GLOB SERPL: 1 (ref 1–1.9)
ALP SERPL-CCNC: 87 U/L (ref 35–104)
ALT SERPL-CCNC: 23 U/L (ref 8–45)
ANION GAP SERPL CALC-SCNC: 12 MMOL/L (ref 7–16)
APPEARANCE UR: CLEAR
AST SERPL-CCNC: 26 U/L (ref 15–37)
BACTERIA URNS QL MICRO: NEGATIVE /HPF
BASOPHILS # BLD: 0.02 K/UL (ref 0–0.2)
BASOPHILS NFR BLD: 0.2 % (ref 0–2)
BILIRUB SERPL-MCNC: 0.3 MG/DL (ref 0–1.2)
BILIRUB UR QL: NEGATIVE
BUN SERPL-MCNC: 12 MG/DL (ref 6–23)
CALCIUM SERPL-MCNC: 9.6 MG/DL (ref 8.8–10.2)
CASTS URNS QL MICRO: 0 /LPF
CHLORIDE SERPL-SCNC: 99 MMOL/L (ref 98–107)
CO2 SERPL-SCNC: 28 MMOL/L (ref 20–29)
COLOR UR: NORMAL
CREAT SERPL-MCNC: 0.61 MG/DL (ref 0.6–1.1)
CRYSTALS URNS QL MICRO: 0 /LPF
DIFFERENTIAL METHOD BLD: NORMAL
EOSINOPHIL # BLD: 0.61 K/UL (ref 0–0.8)
EOSINOPHIL NFR BLD: 6.5 % (ref 0.5–7.8)
EPI CELLS #/AREA URNS HPF: NORMAL /HPF (ref 0–5)
ERYTHROCYTE [DISTWIDTH] IN BLOOD BY AUTOMATED COUNT: 13.3 % (ref 11.9–14.6)
GLOBULIN SER CALC-MCNC: 4.4 G/DL (ref 2.3–3.5)
GLUCOSE SERPL-MCNC: 90 MG/DL (ref 70–99)
GLUCOSE UR STRIP.AUTO-MCNC: NEGATIVE MG/DL
HCT VFR BLD AUTO: 44.7 % (ref 35.8–46.3)
HGB BLD-MCNC: 15 G/DL (ref 11.7–15.4)
HGB UR QL STRIP: NEGATIVE
HYALINE CASTS URNS QL MICRO: NORMAL /LPF
IMM GRANULOCYTES # BLD AUTO: 0.04 K/UL (ref 0–0.5)
IMM GRANULOCYTES NFR BLD AUTO: 0.4 % (ref 0–5)
KETONES UR QL STRIP.AUTO: NEGATIVE MG/DL
LEUKOCYTE ESTERASE UR QL STRIP.AUTO: NEGATIVE
LYMPHOCYTES # BLD: 3.05 K/UL (ref 0.5–4.6)
LYMPHOCYTES NFR BLD: 32.3 % (ref 13–44)
MCH RBC QN AUTO: 29.1 PG (ref 26.1–32.9)
MCHC RBC AUTO-ENTMCNC: 33.6 G/DL (ref 31.4–35)
MCV RBC AUTO: 86.8 FL (ref 82–102)
MONOCYTES # BLD: 0.59 K/UL (ref 0.1–1.3)
MONOCYTES NFR BLD: 6.3 % (ref 4–12)
MUCOUS THREADS URNS QL MICRO: 0 /LPF
NEUTS SEG # BLD: 5.13 K/UL (ref 1.7–8.2)
NEUTS SEG NFR BLD: 54.3 % (ref 43–78)
NITRITE UR QL STRIP.AUTO: NEGATIVE
NRBC # BLD: 0 K/UL (ref 0–0.2)
PH UR STRIP: 6 (ref 5–9)
PLATELET # BLD AUTO: 288 K/UL (ref 150–450)
PMV BLD AUTO: 9.7 FL (ref 9.4–12.3)
POTASSIUM SERPL-SCNC: 3.6 MMOL/L (ref 3.5–5.1)
PROT SERPL-MCNC: 8.6 G/DL (ref 6.3–8.2)
PROT UR STRIP-MCNC: NEGATIVE MG/DL
RBC # BLD AUTO: 5.15 M/UL (ref 4.05–5.2)
RBC #/AREA URNS HPF: NORMAL /HPF (ref 0–5)
SODIUM SERPL-SCNC: 138 MMOL/L (ref 136–145)
SP GR UR REFRACTOMETRY: 1.01 (ref 1–1.02)
TSH W FREE THYROID IF ABNORMAL: 2.08 UIU/ML (ref 0.27–4.2)
URINE CULTURE IF INDICATED: NORMAL
UROBILINOGEN UR QL STRIP.AUTO: 0.2 EU/DL (ref 0.2–1)
WBC # BLD AUTO: 9.4 K/UL (ref 4.3–11.1)
WBC URNS QL MICRO: NORMAL /HPF (ref 0–4)

## 2025-04-16 PROCEDURE — 99214 OFFICE O/P EST MOD 30 MIN: CPT | Performed by: FAMILY MEDICINE

## 2025-04-16 RX ORDER — ESCITALOPRAM OXALATE 10 MG/1
10 TABLET ORAL DAILY
Qty: 90 TABLET | Refills: 3 | Status: SHIPPED | OUTPATIENT
Start: 2025-04-16

## 2025-04-16 ASSESSMENT — ENCOUNTER SYMPTOMS: ABDOMINAL PAIN: 1

## 2025-04-16 NOTE — ASSESSMENT & PLAN NOTE
She really is not taking anything right now that will help control her anxiety so I think the Lexapro will be a step in the right direction.

## 2025-04-16 NOTE — ASSESSMENT & PLAN NOTE
Getting basic labs and will also check a urinalysis and set her up for a CT abdomen pelvis with IV contrast.  So far I have not heard mention of anything that makes me think she has irritable bowel syndrome.    Orders:    CBC with Auto Differential; Future    Comprehensive Metabolic Panel; Future    Urinalysis with Reflex to Culture; Future    CT ABDOMEN PELVIS W IV CONTRAST Additional Contrast? None; Future

## 2025-04-16 NOTE — PROGRESS NOTES
Marky Garrison (:  1975) is a 49 y.o. female,Established patient, here for evaluation of the following chief complaint(s):  Abdominal Pain (Went to urgent care on 4/10, (finished antibiotics) lower abdominal pain) and Dizziness         Assessment & Plan  Schizoaffective disorder, bipolar type (HCC)  It may be the one of the ways the patient manifests her psychiatric distress is through abdominal pain.  Her son states that she has been given psychiatric type medicines in the past for the pain and that has helped.  She currently takes an antipsychotic but nothing for depression and anxiety.  I think that might be very helpful for her.  Prescribing Lexapro 10 mg a day.         PTSD (post-traumatic stress disorder)  Sure with the root cause of her PTSD is.  This may also be helped by Lexapro and it seems like the Vraylar has helped also.  Continue current medications.         Generalized anxiety disorder  She really is not taking anything right now that will help control her anxiety so I think the Lexapro will be a step in the right direction.         Lower abdominal pain  Getting basic labs and will also check a urinalysis and set her up for a CT abdomen pelvis with IV contrast.  So far I have not heard mention of anything that makes me think she has irritable bowel syndrome.    Orders:    CBC with Auto Differential; Future    Comprehensive Metabolic Panel; Future    Urinalysis with Reflex to Culture; Future    CT ABDOMEN PELVIS W IV CONTRAST Additional Contrast? None; Future    Uninodular goiter (nontoxic)  The patient has a history of goiter we will get a TSH today to see what her baseline thyroid function is.  I do not palpate any enlarged glands today.    Orders:    TSH reflex to FT4; Future      No follow-ups on file.       Subjective   Lower abd pain that is severe at times that started within the past year.  The patient has a little of it at this time but but it gets much more severe on occasion.

## 2025-04-16 NOTE — ASSESSMENT & PLAN NOTE
The patient has a history of goiter we will get a TSH today to see what her baseline thyroid function is.  I do not palpate any enlarged glands today.    Orders:    TSH reflex to FT4; Future

## 2025-04-16 NOTE — ASSESSMENT & PLAN NOTE
It may be the one of the ways the patient manifests her psychiatric distress is through abdominal pain.  Her son states that she has been given psychiatric type medicines in the past for the pain and that has helped.  She currently takes an antipsychotic but nothing for depression and anxiety.  I think that might be very helpful for her.  Prescribing Lexapro 10 mg a day.

## 2025-04-16 NOTE — PATIENT INSTRUCTIONS
Please follow-up with me at the office in 3 weeks to see how we are coming with the new prescription for depression and anxiety.  As well we will begin able to go over the lab work and CT scan.  Please return to see me or proceed to the emergency department if you have any new or worsening symptoms.  Best wishes, Dr. Fish

## 2025-04-16 NOTE — ASSESSMENT & PLAN NOTE
Sure with the root cause of her PTSD is.  This may also be helped by Lexapro and it seems like the Vraylar has helped also.  Continue current medications.

## 2025-04-17 ENCOUNTER — RESULTS FOLLOW-UP (OUTPATIENT)
Dept: FAMILY MEDICINE CLINIC | Facility: CLINIC | Age: 50
End: 2025-04-17

## 2025-04-23 ENCOUNTER — OFFICE VISIT (OUTPATIENT)
Dept: FAMILY MEDICINE CLINIC | Facility: CLINIC | Age: 50
End: 2025-04-23
Payer: COMMERCIAL

## 2025-04-23 VITALS
DIASTOLIC BLOOD PRESSURE: 68 MMHG | RESPIRATION RATE: 18 BRPM | BODY MASS INDEX: 29.43 KG/M2 | TEMPERATURE: 97.8 F | SYSTOLIC BLOOD PRESSURE: 104 MMHG | HEART RATE: 65 BPM | HEIGHT: 59 IN | OXYGEN SATURATION: 99 % | WEIGHT: 146 LBS

## 2025-04-23 DIAGNOSIS — N95.0 POSTMENOPAUSAL VAGINAL BLEEDING: Primary | ICD-10-CM

## 2025-04-23 DIAGNOSIS — R10.30 LOWER ABDOMINAL PAIN: ICD-10-CM

## 2025-04-23 PROCEDURE — 99214 OFFICE O/P EST MOD 30 MIN: CPT | Performed by: FAMILY MEDICINE

## 2025-04-23 NOTE — PROGRESS NOTES
Marky Garrison (:  1975) is a 49 y.o. female,Established patient, here for evaluation of the following chief complaint(s):  Follow-up (1 week f/u and discuss labs// pt has not had her period in over 1 year and is now bleeding again )         Assessment & Plan  Postmenopausal vaginal bleeding  Given the fact that she had some thickened endometrium last year when she might have been postmenopausal already, I think mandates that we repeat her ultrasound at this time.  I have also recommended that they go back to see Dr. Burton as at this point he might want to do endometrial sampling.  Recommending ibuprofen 400 mg up to 3 times a day that she can take with Tylenol if needed.  Seems to be quite stable.  Nothing to suggest significant blood loss at this time.         Lower abdominal pain  This is intermittent and is usually activity related.  Dyspareunia reported.  The ultrasound will be a good way to start evaluating this and ultimately I would like to get her CT completed.           Return in about 6 weeks (around 2025).  I would like to see her back sooner if her symptoms are worsening or she is having trouble getting into gynecology.       Subjective   Had stopped menses for about 10 months and now has started another period.  Heavy bleeding yesterdayj and up to this point.  Still has lower abd pain, mostly with activity and sex.  This has been an intermittent problem and was evaluated by MD about one year ago with similar sx.  Had us which showed endometrial thickness of 1.2 cm.  Her menstrual history is a little unclear and so I cannot say for certain that she was postmenopausal at this time.  Unfortunately she has not gotten the CT abdomen pelvis that I ordered at her previous visit.  I think this is a communication issue if she does not speak English.  Her son is with her today who does all her communicating for her.  The  straightened out her next of kin information preferences as

## 2025-04-23 NOTE — ASSESSMENT & PLAN NOTE
This is intermittent and is usually activity related.  Dyspareunia reported.  The ultrasound will be a good way to start evaluating this and ultimately I would like to get her CT completed.

## 2025-04-23 NOTE — ASSESSMENT & PLAN NOTE
Given the fact that she had some thickened endometrium last year when she might have been postmenopausal already, I think mandates that we repeat her ultrasound at this time.  I have also recommended that they go back to see Dr. Burton as at this point he might want to do endometrial sampling.  Recommending ibuprofen 400 mg up to 3 times a day that she can take with Tylenol if needed.  Seems to be quite stable.  Nothing to suggest significant blood loss at this time.

## 2025-04-28 ENCOUNTER — PROCEDURE VISIT (OUTPATIENT)
Dept: OBGYN CLINIC | Age: 50
End: 2025-04-28
Payer: COMMERCIAL

## 2025-04-28 ENCOUNTER — OFFICE VISIT (OUTPATIENT)
Dept: OBGYN CLINIC | Age: 50
End: 2025-04-28
Payer: COMMERCIAL

## 2025-04-28 VITALS — HEIGHT: 59 IN | BODY MASS INDEX: 29.48 KG/M2 | WEIGHT: 146.2 LBS

## 2025-04-28 DIAGNOSIS — N95.0 POST-MENOPAUSAL BLEEDING: Primary | ICD-10-CM

## 2025-04-28 DIAGNOSIS — N95.0 POSTMENOPAUSAL VAGINAL BLEEDING: Primary | ICD-10-CM

## 2025-04-28 DIAGNOSIS — N95.0 POST-MENOPAUSAL BLEEDING: ICD-10-CM

## 2025-04-28 LAB
ESTRADIOL SERPL-MCNC: 30.1 PG/ML
FSH SERPL-ACNC: 42.9 MIU/ML
LH SERPL-ACNC: 13.7 MIU/ML

## 2025-04-28 PROCEDURE — 99214 OFFICE O/P EST MOD 30 MIN: CPT | Performed by: OBSTETRICS & GYNECOLOGY

## 2025-04-28 PROCEDURE — 76830 TRANSVAGINAL US NON-OB: CPT | Performed by: OBSTETRICS & GYNECOLOGY

## 2025-04-28 NOTE — PROGRESS NOTES
The patient is a 49 y.o.  who is seen for TVUS 2/2 PMB 25-25 that started as spotting but then progressively got heavier having to change her pad q hour. Reports 10d prior to bleeding episode she had abdominal bloating w/ associated pain. Reports she is still in pain and has trouble doing things at home d/t pelvic pain.     Prior to bleeding episode she had not had a period x 10mo.     TVUS today 25:  CX: Appears WNL   UTERUS: Anteverted and heterogenous. Very small simple cyst noted in myometrium on rt side.   ENDO= 4.0 mm, no intracavity masses visualized. Trace amount of simple fluid noted fundally.   ROV: WNL   LOV: WNL   No adn masses or free fluid visualized.     TVUS 3/15/24 d/t PMB:  FINDINGS:  Uterus: 10.3 x 4.9 x 5.9 cm.  Endometrium: 1.2 cm in double layer thickness.  Right ovary: 3.3 x 1.7 x 1.8 cm. 1.3 x 1.4 x 2.0 cm dominant follicle/cyst.  Vascular flow detected.  Left ovary: 2.6 x 1.5 x 1.9 cm. No mass. Vascular flow detected.  Fluid: No significant free fluid.     IMPRESSION:  Right ovary with 2 cm dominant follicle/cyst, no follow-up recommended.  Redemonstrated endometrium measuring 1.2 cm. If patient is postmenopausal,  recommend OB/GYN consultation.    HISTORY:      Patient's last menstrual period was 2024 (exact date).  Sexual History:  sexually active   Contraception:  nothing   Current Outpatient Medications on File Prior to Visit   Medication Sig Dispense Refill    escitalopram (LEXAPRO) 10 MG tablet Take 1 tablet by mouth daily For depression and anxiety 90 tablet 3    cariprazine hcl (VRAYLAR) 1.5 MG capsule Take 1 capsule by mouth nightly 30 capsule 3    Multiple Vitamins-Minerals (THERAPEUTIC MULTIVITAMIN-MINERALS) tablet Take 1 tablet by mouth daily      HYDROcodone homatropine (HYCODAN) 5-1.5 MG/5ML solution  (Patient not taking: Reported on 3/3/2025)      albuterol sulfate HFA (VENTOLIN HFA) 108 (90 Base) MCG/ACT inhaler Inhale 2 puffs into the lungs 4

## 2025-05-19 ENCOUNTER — OFFICE VISIT (OUTPATIENT)
Dept: OBGYN CLINIC | Age: 50
End: 2025-05-19
Payer: COMMERCIAL

## 2025-05-19 VITALS
SYSTOLIC BLOOD PRESSURE: 108 MMHG | DIASTOLIC BLOOD PRESSURE: 72 MMHG | HEIGHT: 59 IN | WEIGHT: 149.5 LBS | BODY MASS INDEX: 30.14 KG/M2

## 2025-05-19 DIAGNOSIS — Z12.4 SCREENING FOR CERVICAL CANCER: Primary | ICD-10-CM

## 2025-05-19 DIAGNOSIS — Z11.51 SCREENING FOR HPV (HUMAN PAPILLOMAVIRUS): ICD-10-CM

## 2025-05-19 PROCEDURE — 99214 OFFICE O/P EST MOD 30 MIN: CPT | Performed by: OBSTETRICS & GYNECOLOGY

## 2025-05-19 NOTE — PROGRESS NOTES
The patient is a 50 y.o.  who is seen for recheck and pap smear. Patient complains of abdominal/pelvic pain recently as well as fatigue. Patient has been evaluated for pelvic pain, but reports that is still intermittently present.  C/o neck pain starting at skull, radiating down spine  HISTORY:  Patient seen 2025 c/o pelvic pain and post menopausal bleeding 2025-2025.   TVUS 25:  CX: Appears WNL   UTERUS: Anteverted and heterogenous. Very small simple cyst noted in myometrium on rt side.   ENDO= 4.0 mm, no intracavity masses visualized. Trace amount of simple fluid noted fundally.   ROV: WNL   LOV: WNL   No adn masses or free fluid visualized.      TVUS 3/15/24 d/t PMB:  FINDINGS:  Uterus: 10.3 x 4.9 x 5.9 cm.  Endometrium: 1.2 cm in double layer thickness.  Right ovary: 3.3 x 1.7 x 1.8 cm. 1.3 x 1.4 x 2.0 cm dominant follicle/cyst.  Vascular flow detected.  Left ovary: 2.6 x 1.5 x 1.9 cm. No mass. Vascular flow detected.  Fluid: No significant free fluid.     IMPRESSION:  Right ovary with 2 cm dominant follicle/cyst, no follow-up recommended.  Redemonstrated endometrium measuring 1.2 cm. If patient is postmenopausal,  recommend OB/GYN consultation.    FSH 2025: 42.9  Estradiol 2025: 30.10  LH 2025: 13.7    Patient is also having pelvic pain that seems to be c/w pelvic prolapse per note 2025    Patient's last menstrual period was 2024 (exact date).  Sexual History:  not sexually active  Contraception:  Depo-Provera injections  Current Outpatient Medications on File Prior to Visit   Medication Sig Dispense Refill    escitalopram (LEXAPRO) 10 MG tablet Take 1 tablet by mouth daily For depression and anxiety 90 tablet 3    cariprazine hcl (VRAYLAR) 1.5 MG capsule Take 1 capsule by mouth nightly 30 capsule 3    Multiple Vitamins-Minerals (THERAPEUTIC MULTIVITAMIN-MINERALS) tablet Take 1 tablet by mouth daily (Patient not taking: Reported on 2025)

## 2025-05-21 LAB
COLLECTION METHOD: NORMAL
CYTOLOGIST CVX/VAG CYTO: NORMAL
CYTOLOGY CVX/VAG DOC THIN PREP: NORMAL
HPV APTIMA: NEGATIVE
Lab: NORMAL
PAP SOURCE: NORMAL
PATH REPORT.FINAL DX SPEC: NORMAL
STAT OF ADQ CVX/VAG CYTO-IMP: NORMAL

## 2025-06-12 ENCOUNTER — OFFICE VISIT (OUTPATIENT)
Dept: FAMILY MEDICINE CLINIC | Facility: CLINIC | Age: 50
End: 2025-06-12
Payer: COMMERCIAL

## 2025-06-12 VITALS
BODY MASS INDEX: 30.84 KG/M2 | RESPIRATION RATE: 18 BRPM | SYSTOLIC BLOOD PRESSURE: 114 MMHG | DIASTOLIC BLOOD PRESSURE: 78 MMHG | HEIGHT: 59 IN | TEMPERATURE: 97.8 F | OXYGEN SATURATION: 98 % | WEIGHT: 153 LBS | HEART RATE: 64 BPM

## 2025-06-12 DIAGNOSIS — G47.01 INSOMNIA DUE TO MEDICAL CONDITION: ICD-10-CM

## 2025-06-12 DIAGNOSIS — M25.561 CHRONIC PAIN OF BOTH KNEES: ICD-10-CM

## 2025-06-12 DIAGNOSIS — M79.621 PAIN IN RIGHT UPPER ARM: ICD-10-CM

## 2025-06-12 DIAGNOSIS — M25.562 CHRONIC PAIN OF BOTH KNEES: ICD-10-CM

## 2025-06-12 DIAGNOSIS — G89.29 CHRONIC PAIN OF BOTH KNEES: ICD-10-CM

## 2025-06-12 DIAGNOSIS — M54.2 NECK PAIN: Primary | ICD-10-CM

## 2025-06-12 PROBLEM — R52 BODY ACHES: Status: ACTIVE | Noted: 2025-06-12

## 2025-06-12 PROCEDURE — 99214 OFFICE O/P EST MOD 30 MIN: CPT | Performed by: FAMILY MEDICINE

## 2025-06-12 SDOH — ECONOMIC STABILITY: FOOD INSECURITY: WITHIN THE PAST 12 MONTHS, YOU WORRIED THAT YOUR FOOD WOULD RUN OUT BEFORE YOU GOT MONEY TO BUY MORE.: NEVER TRUE

## 2025-06-12 SDOH — ECONOMIC STABILITY: FOOD INSECURITY: WITHIN THE PAST 12 MONTHS, THE FOOD YOU BOUGHT JUST DIDN'T LAST AND YOU DIDN'T HAVE MONEY TO GET MORE.: NEVER TRUE

## 2025-06-12 ASSESSMENT — PATIENT HEALTH QUESTIONNAIRE - PHQ9
6. FEELING BAD ABOUT YOURSELF - OR THAT YOU ARE A FAILURE OR HAVE LET YOURSELF OR YOUR FAMILY DOWN: NOT AT ALL
SUM OF ALL RESPONSES TO PHQ QUESTIONS 1-9: 0
5. POOR APPETITE OR OVEREATING: NOT AT ALL
10. IF YOU CHECKED OFF ANY PROBLEMS, HOW DIFFICULT HAVE THESE PROBLEMS MADE IT FOR YOU TO DO YOUR WORK, TAKE CARE OF THINGS AT HOME, OR GET ALONG WITH OTHER PEOPLE: NOT DIFFICULT AT ALL
1. LITTLE INTEREST OR PLEASURE IN DOING THINGS: NOT AT ALL
8. MOVING OR SPEAKING SO SLOWLY THAT OTHER PEOPLE COULD HAVE NOTICED. OR THE OPPOSITE, BEING SO FIGETY OR RESTLESS THAT YOU HAVE BEEN MOVING AROUND A LOT MORE THAN USUAL: NOT AT ALL
9. THOUGHTS THAT YOU WOULD BE BETTER OFF DEAD, OR OF HURTING YOURSELF: NOT AT ALL
SUM OF ALL RESPONSES TO PHQ QUESTIONS 1-9: 0
7. TROUBLE CONCENTRATING ON THINGS, SUCH AS READING THE NEWSPAPER OR WATCHING TELEVISION: NOT AT ALL
3. TROUBLE FALLING OR STAYING ASLEEP: NOT AT ALL
2. FEELING DOWN, DEPRESSED OR HOPELESS: NOT AT ALL
SUM OF ALL RESPONSES TO PHQ QUESTIONS 1-9: 0
4. FEELING TIRED OR HAVING LITTLE ENERGY: NOT AT ALL
SUM OF ALL RESPONSES TO PHQ QUESTIONS 1-9: 0

## 2025-06-12 NOTE — PROGRESS NOTES
Marky Garrison (:  1975) is a 50 y.o. female,Established patient, here for evaluation of the following chief complaint(s):  Follow-up (Pain in the middle of the neck that gets worse when laying down ) and Other (Pain over all body can't hardly work because of the pain /Havent been able to sleep and having head aches. )         Assessment & Plan  Neck pain  I think the neck pain is tension pain.  I am not sure what the stress generator might be.  She does not have any family with her at the visit today.  She has a very inactive lifestyle from what I can tell from the patient.  I think she needs to begin to exercise and do stretches.  I am recommending walking and just gentle stretch of the different body parts that are bothering her.  She cannot get to physical therapy so we did not prescribe any of that today.  Doses of Tylenol which would be up to 2 extra strength up to 3 times a day or ibuprofen 600 mg up to 3 times a day will also help as well.  Ice and heat were also recommended and explained to her through the .         Insomnia due to medical condition  I think this is a factor in contributing to her feeling poorly.  She only got 2 hours of sleep last night so I am sure she will not feel well until that changes.  I think doing the stretching mentioned above and taking some Tylenol before she goes to bed may be a good place to start.         Pain in right upper arm  I do not feel or see any significant pathology in the shoulder or elbow of the right arm.  There was no popping or catching or sense of subluxation.  Again I think stretching and use are going to be important in getting her more pain pain relief going forward.         Chronic pain of both knees  Patient is very sedentary and I think we need to increase her activity.  Since she cannot get the therapy we will just have her walk around her neighborhood starting for 15 minutes a day and gradually escalating up to 45 minutes a day if

## 2025-06-12 NOTE — ASSESSMENT & PLAN NOTE
I think the neck pain is tension pain.  I am not sure what the stress generator might be.  She does not have any family with her at the visit today.  She has a very inactive lifestyle from what I can tell from the patient.  I think she needs to begin to exercise and do stretches.  I am recommending walking and just gentle stretch of the different body parts that are bothering her.  She cannot get to physical therapy so we did not prescribe any of that today.  Doses of Tylenol which would be up to 2 extra strength up to 3 times a day or ibuprofen 600 mg up to 3 times a day will also help as well.  Ice and heat were also recommended and explained to her through the .

## 2025-06-12 NOTE — ASSESSMENT & PLAN NOTE
Patient is very sedentary and I think we need to increase her activity.  Since she cannot get the therapy we will just have her walk around her neighborhood starting for 15 minutes a day and gradually escalating up to 45 minutes a day if she tolerates that.  She has follow-up with Racquel Wynn on 7 July and can recheck at that point.

## 2025-06-12 NOTE — CONSULTS
Session ID: [id number]  Session Duration: Longer than 54 minutes  Language: Syrian   ID: #[id number]   Name: Wisam

## 2025-06-12 NOTE — ASSESSMENT & PLAN NOTE
I do not feel or see any significant pathology in the shoulder or elbow of the right arm.  There was no popping or catching or sense of subluxation.  Again I think stretching and use are going to be important in getting her more pain pain relief going forward.

## 2025-06-12 NOTE — ASSESSMENT & PLAN NOTE
I think this is a factor in contributing to her feeling poorly.  She only got 2 hours of sleep last night so I am sure she will not feel well until that changes.  I think doing the stretching mentioned above and taking some Tylenol before she goes to bed may be a good place to start.

## 2025-06-26 NOTE — PROGRESS NOTES
Marky Garrison (:  1975) is a 50 y.o. female,Established patient, here for evaluation of the following chief complaint(s):  Hyperlipidemia (6 month routine f/u with lab review. )          Assessment/Plan  1. Mixed hyperlipidemia  2. Chronic gastritis without bleeding, unspecified gastritis type  -     famotidine (PEPCID) 20 MG tablet; Take 1 tablet by mouth 2 times daily, Disp-60 tablet, R-3Normal  3. Drug-induced constipation  Comments:  Lexapro?  4. Schizoaffective disorder, bipolar type (HCC)  5. Generalized anxiety disorder         Patient Instructions   Suggest resuming Famotidine 20 mg twice daily x 4-8 weeks then can reduce to as needed if feeling better  Consider addition of magnesium glycinate 300 mg nightly to help with sleep hallucinations and hopefully bowel regularity as well  Reminded of upcoming appointment with Cate Galvez tomorrow  Explained that fatigue & constipation could be a side effect of Lexapro that she's been taking  Recommend using Stevia instead of sugar in coffee to help reduce triglycerides  Encouraged to do more physical exercise  Reminded that she is due for mammogram in December and provided contact # for her to schedule with radiology      Return in about 6 months (around 2026) for routine physical w/ fasting labs 3-7 days prior.      Subjective   HPI  The patient is a 50 y.o. female who is seen for evaluation of hyperlipidemia.  She was tested because of mixed hyperlipidemia.  The current state of this condition is asymptomatic, needs to follow diet more regularly, and poorly controlled - not currently on medications for this problem.  She denies eating a lot of sweets or junk foods but does enjoy coffee and admits to putting a lot of sugar in it.  Additionally, she eats rice regularly and doesn't exercise much.      Last Lipid Panel:   Lab Results   Component Value Date    CHOL 227 (H) 2025    CHOL 200 2024    CHOL 323 (H) 2024     Lab Results

## 2025-06-30 ENCOUNTER — LAB (OUTPATIENT)
Dept: INTERNAL MEDICINE CLINIC | Facility: CLINIC | Age: 50
End: 2025-06-30

## 2025-06-30 DIAGNOSIS — R73.01 ELEVATED FASTING GLUCOSE: ICD-10-CM

## 2025-06-30 DIAGNOSIS — E78.49: ICD-10-CM

## 2025-06-30 LAB
ALBUMIN SERPL-MCNC: 3.6 G/DL (ref 3.5–5)
ALBUMIN/GLOB SERPL: 0.9 (ref 1–1.9)
ALP SERPL-CCNC: 81 U/L (ref 35–104)
ALT SERPL-CCNC: 21 U/L (ref 8–45)
ANION GAP SERPL CALC-SCNC: 12 MMOL/L (ref 7–16)
AST SERPL-CCNC: 25 U/L (ref 15–37)
BILIRUB SERPL-MCNC: 0.3 MG/DL (ref 0–1.2)
BUN SERPL-MCNC: 11 MG/DL (ref 6–23)
CALCIUM SERPL-MCNC: 9.2 MG/DL (ref 8.8–10.2)
CHLORIDE SERPL-SCNC: 105 MMOL/L (ref 98–107)
CHOLEST SERPL-MCNC: 227 MG/DL (ref 0–200)
CO2 SERPL-SCNC: 24 MMOL/L (ref 20–29)
CREAT SERPL-MCNC: 0.67 MG/DL (ref 0.6–1.1)
GLOBULIN SER CALC-MCNC: 4.2 G/DL (ref 2.3–3.5)
GLUCOSE SERPL-MCNC: 98 MG/DL (ref 70–99)
HDLC SERPL-MCNC: 45 MG/DL (ref 40–60)
HDLC SERPL: 5 (ref 0–5)
LDLC SERPL CALC-MCNC: 136 MG/DL (ref 0–100)
POTASSIUM SERPL-SCNC: 4 MMOL/L (ref 3.5–5.1)
PROT SERPL-MCNC: 7.7 G/DL (ref 6.3–8.2)
SODIUM SERPL-SCNC: 141 MMOL/L (ref 136–145)
TRIGL SERPL-MCNC: 229 MG/DL (ref 0–150)
VLDLC SERPL CALC-MCNC: 46 MG/DL (ref 6–23)

## 2025-07-07 ENCOUNTER — RESULTS FOLLOW-UP (OUTPATIENT)
Dept: INTERNAL MEDICINE CLINIC | Facility: CLINIC | Age: 50
End: 2025-07-07

## 2025-07-07 ENCOUNTER — OFFICE VISIT (OUTPATIENT)
Dept: INTERNAL MEDICINE CLINIC | Facility: CLINIC | Age: 50
End: 2025-07-07
Payer: COMMERCIAL

## 2025-07-07 VITALS
DIASTOLIC BLOOD PRESSURE: 78 MMHG | SYSTOLIC BLOOD PRESSURE: 108 MMHG | TEMPERATURE: 97 F | WEIGHT: 154 LBS | HEART RATE: 96 BPM | HEIGHT: 59 IN | OXYGEN SATURATION: 97 % | BODY MASS INDEX: 31.04 KG/M2

## 2025-07-07 DIAGNOSIS — K29.50 CHRONIC GASTRITIS WITHOUT BLEEDING, UNSPECIFIED GASTRITIS TYPE: ICD-10-CM

## 2025-07-07 DIAGNOSIS — F25.0 SCHIZOAFFECTIVE DISORDER, BIPOLAR TYPE (HCC): ICD-10-CM

## 2025-07-07 DIAGNOSIS — K59.03 DRUG-INDUCED CONSTIPATION: ICD-10-CM

## 2025-07-07 DIAGNOSIS — F41.1 GENERALIZED ANXIETY DISORDER: ICD-10-CM

## 2025-07-07 DIAGNOSIS — E78.2 MIXED HYPERLIPIDEMIA: Primary | ICD-10-CM

## 2025-07-07 DIAGNOSIS — Z00.00 ROUTINE PHYSICAL EXAMINATION: Primary | ICD-10-CM

## 2025-07-07 PROCEDURE — 99215 OFFICE O/P EST HI 40 MIN: CPT | Performed by: PHYSICIAN ASSISTANT

## 2025-07-07 RX ORDER — FAMOTIDINE 20 MG/1
20 TABLET, FILM COATED ORAL 2 TIMES DAILY
Qty: 60 TABLET | Refills: 3 | Status: SHIPPED | OUTPATIENT
Start: 2025-07-07

## 2025-07-07 ASSESSMENT — ENCOUNTER SYMPTOMS
DIARRHEA: 0
ABDOMINAL PAIN: 0
CONSTIPATION: 1
ABDOMINAL DISTENTION: 1
NAUSEA: 1
VOMITING: 0
BLOOD IN STOOL: 0

## 2025-07-07 NOTE — CONSULTS
Session ID: [id number]  Session Duration: Longer than 45 minutes  Language: Nicaraguan   ID: #[id number]   Name: Sera

## 2025-07-07 NOTE — PATIENT INSTRUCTIONS
Suggest resuming Famotidine 20 mg twice daily x 4-8 weeks then can reduce to as needed if feeling better  Consider addition of magnesium glycinate 300 mg nightly to help with sleep hallucinations and hopefully bowel regularity as well  Reminded of upcoming appointment with Cate Galvez tomorrow  Explained that fatigue & constipation could be a side effect of Lexapro that she's been taking  Recommend using Stevia instead of sugar in coffee to help reduce triglycerides  Encouraged to do more physical exercise  Reminded that she is due for mammogram in December and provided contact # for her to schedule with radiology

## 2025-07-08 ENCOUNTER — OFFICE VISIT (OUTPATIENT)
Dept: BEHAVIORAL/MENTAL HEALTH CLINIC | Age: 50
End: 2025-07-08
Payer: COMMERCIAL

## 2025-07-08 VITALS
DIASTOLIC BLOOD PRESSURE: 72 MMHG | WEIGHT: 160 LBS | HEIGHT: 59 IN | RESPIRATION RATE: 16 BRPM | HEART RATE: 85 BPM | OXYGEN SATURATION: 99 % | SYSTOLIC BLOOD PRESSURE: 118 MMHG | TEMPERATURE: 98.7 F | BODY MASS INDEX: 32.25 KG/M2

## 2025-07-08 DIAGNOSIS — F43.10 PTSD (POST-TRAUMATIC STRESS DISORDER): ICD-10-CM

## 2025-07-08 DIAGNOSIS — F41.1 GENERALIZED ANXIETY DISORDER: Primary | ICD-10-CM

## 2025-07-08 DIAGNOSIS — F25.0 SCHIZOAFFECTIVE DISORDER, BIPOLAR TYPE (HCC): ICD-10-CM

## 2025-07-08 DIAGNOSIS — G47.01 INSOMNIA DUE TO MEDICAL CONDITION: ICD-10-CM

## 2025-07-08 PROCEDURE — 99215 OFFICE O/P EST HI 40 MIN: CPT | Performed by: NURSE PRACTITIONER

## 2025-07-08 RX ORDER — ESCITALOPRAM OXALATE 10 MG/1
10 TABLET ORAL DAILY
Qty: 90 TABLET | Refills: 3 | Status: SHIPPED | OUTPATIENT
Start: 2025-07-08

## 2025-07-08 ASSESSMENT — PATIENT HEALTH QUESTIONNAIRE - PHQ9
4. FEELING TIRED OR HAVING LITTLE ENERGY: NOT AT ALL
10. IF YOU CHECKED OFF ANY PROBLEMS, HOW DIFFICULT HAVE THESE PROBLEMS MADE IT FOR YOU TO DO YOUR WORK, TAKE CARE OF THINGS AT HOME, OR GET ALONG WITH OTHER PEOPLE: NOT DIFFICULT AT ALL
7. TROUBLE CONCENTRATING ON THINGS, SUCH AS READING THE NEWSPAPER OR WATCHING TELEVISION: NOT AT ALL
8. MOVING OR SPEAKING SO SLOWLY THAT OTHER PEOPLE COULD HAVE NOTICED. OR THE OPPOSITE, BEING SO FIGETY OR RESTLESS THAT YOU HAVE BEEN MOVING AROUND A LOT MORE THAN USUAL: NOT AT ALL
9. THOUGHTS THAT YOU WOULD BE BETTER OFF DEAD, OR OF HURTING YOURSELF: NOT AT ALL
2. FEELING DOWN, DEPRESSED OR HOPELESS: NOT AT ALL
SUM OF ALL RESPONSES TO PHQ QUESTIONS 1-9: 0
SUM OF ALL RESPONSES TO PHQ QUESTIONS 1-9: 0
1. LITTLE INTEREST OR PLEASURE IN DOING THINGS: NOT AT ALL
SUM OF ALL RESPONSES TO PHQ QUESTIONS 1-9: 0
3. TROUBLE FALLING OR STAYING ASLEEP: NOT AT ALL
5. POOR APPETITE OR OVEREATING: NOT AT ALL
SUM OF ALL RESPONSES TO PHQ QUESTIONS 1-9: 0
6. FEELING BAD ABOUT YOURSELF - OR THAT YOU ARE A FAILURE OR HAVE LET YOURSELF OR YOUR FAMILY DOWN: NOT AT ALL

## 2025-07-08 NOTE — PROGRESS NOTES
OUTPATIENT PSYCHIATRIC RETURN VISIT PROGRESS NOTE      --Cate Galvez, APRN - CNP on 7/8/2025 at 10:05 AM    An electronic signature was used to authenticate this note.   Date of Service: 7/8/2025     Identification    Marky Garrison is a 50 y.o.  with a past psychiatric history of schizoaffective d/o bipola, anxiety, PTSD r who presents today for a psychiatric follow up appointment.      CC:  Routine medication management follow up.    Chief Complaint   Patient presents with    Follow-up     Medication has helped a lot, but still anxious        Subjective / Interval History:    Pt comes to clinic today and  is seen using interpretor services as she speaks only Hebrew.  Patient's mood is stabilizing on current medication regimen and patient is tolerating current medications with no adverse effects. Vraylar  1.5 with Lexapro 10 mg has been effective for depression/anxiety. No longer waking up in the middle of the night. Headaches reported at previous visit have resolved. Says any anxiety she experiences is situational specifically when watching the news or is on social media. She worries about the impact the current administration is having on world events and this makes her feel fearful at times.   . Pt has been medication compliant and denies any side-effects. Pt denies SI, HI and AVH. Does not endorse any manic or psychotic symptoms.    Psychiatric Review Of Systems:  Sleep: generally restful sleep  Appetite: ok  Current suicidal/homicidal ideations: Denies SI/ HI  Current auditory/visual hallucinations: Denies     Medications:    Current Outpatient Medications:     famotidine (PEPCID) 20 MG tablet, Take 1 tablet by mouth 2 times daily, Disp: 60 tablet, Rfl: 3    escitalopram (LEXAPRO) 10 MG tablet, Take 1 tablet by mouth daily For depression and anxiety, Disp: 90 tablet, Rfl: 3    cariprazine hcl (VRAYLAR) 1.5 MG capsule, Take 1 capsule by mouth nightly, Disp: 30 capsule, Rfl: 3    albuterol sulfate HFA